# Patient Record
Sex: FEMALE | Race: WHITE | NOT HISPANIC OR LATINO | Employment: STUDENT | URBAN - METROPOLITAN AREA
[De-identification: names, ages, dates, MRNs, and addresses within clinical notes are randomized per-mention and may not be internally consistent; named-entity substitution may affect disease eponyms.]

---
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
## 2020-01-15 ENCOUNTER — TELEPHONE (OUTPATIENT)
Dept: OBSTETRICS AND GYNECOLOGY | Facility: CLINIC | Age: 20
End: 2020-01-15

## 2020-01-15 RX ORDER — NORGESTIMATE AND ETHINYL ESTRADIOL 0.25-0.035
1 KIT ORAL
Qty: 84 TABLET | Refills: 0 | Status: SHIPPED | OUTPATIENT
Start: 2020-01-15 | End: 2020-02-11 | Stop reason: SINTOL

## 2020-02-06 NOTE — PROGRESS NOTES
2020  Fabby Sheikh is a 20 y.o.   female who presents for annual exam.     Stopped OCPs due to mood changes, mood went back to normal, however periods were q2wks. Restarted COCs, bleeding has returned to nl. Now having issues with mood again.    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    GYN screening history:   Last pap: never    Gyn History:    Patient's last menstrual period was 2020.    Menses: q28d x 7d, nl flow, no IMB, some cramping    Gardasil: completed    The patient is sexually active.   Current contraception: condoms and OCP (estrogen/progesterone)    History of abnormal Pap smear: no  History of abnormal mammogram: no   History of cysts, fibroids, STIs, etc:  yes - hx chlamydia       OB History:   OB History    Para Term  AB Living   0 0 0 0 0 0   SAB TAB Ectopic Multiple Live Births   0 0 0 0 0       Allergies: No known allergies    Prior to Admission medications    Medication Sig Start Date End Date Taking? Authorizing Provider   escitalopram (LEXAPRO) 10 mg tablet Take 10 mg by mouth once daily. 19   Provider, Historical, MD   SPRINTEC, 28, 0.25-35 mg-mcg per tablet Take 1 tablet by mouth once daily. 1/15/20   Lian Vazquez CRNP       Medical History: No past medical history on file.    Surgical History: No past surgical history on file.    Social History:   Social History     Socioeconomic History   • Marital status: Single     Spouse name: None   • Number of children: None   • Years of education: None   • Highest education level: None   Occupational History   • None   Social Needs   • Financial resource strain: None   • Food insecurity:     Worry: None     Inability: None   • Transportation needs:     Medical: None     Non-medical: None   Tobacco Use   • Smoking status: Never Smoker   • Smokeless tobacco: Never Used   Substance and Sexual Activity   • Alcohol use: No   • Drug use: No   • Sexual activity: Yes     Partners: Male     Birth  "control/protection: Condom Male, OCP   Lifestyle   • Physical activity:     Days per week: None     Minutes per session: None   • Stress: None   Relationships   • Social connections:     Talks on phone: None     Gets together: None     Attends Mu-ism service: None     Active member of club or organization: None     Attends meetings of clubs or organizations: None     Relationship status: None   • Intimate partner violence:     Fear of current or ex partner: None     Emotionally abused: None     Physically abused: None     Forced sexual activity: None   Other Topics Concern   • None   Social History Narrative   • None        Family History:   Family History   Problem Relation Age of Onset   • Asthma Biological Mother    • Cancer Neg Hx    • Breast cancer Neg Hx    • Colon cancer Neg Hx    • Ovarian cancer Neg Hx    • Uterine cancer Neg Hx        Review of Systems and Physical Exam  The following have been reviewed and updated as appropriate in this visit: Allergies  Meds  Problems       Visit Vitals  /70   Ht 1.549 m (5' 1\")   Wt 60.3 kg (133 lb)   LMP 01/27/2020   BMI 25.13 kg/m²     HPI  Review of Systems  Physical Exam   Constitutional: She is oriented to person, place, and time. She appears well-developed and well-nourished.   Genitourinary: Vagina normal and uterus normal. Pelvic exam deferred.Pelvic exam was performed with patient in lithotomy exam position.     External female genitalia normal.   Urethral meatus normal.   Urethra normal.   Normal bladder.   Vagina normal.   Cervix exam normal.  Uterus is normal. Uterine contour is regular. Uterus is anteverted.   Adnexa normal.   HENT:   Head: Normocephalic and atraumatic.   Eyes: Pupils are equal, round, and reactive to light.   Neck: Normal range of motion.   Pulmonary/Chest: Effort normal.   Abdominal: Soft. She exhibits no distension and no mass. There is no tenderness. There is no rebound and no guarding. No hernia.   Musculoskeletal: She " exhibits no edema.   Neurological: She is alert and oriented to person, place, and time.   Skin: Skin is warm and dry.   Psychiatric: She has a normal mood and affect.         Assessment/Plan:  Diagnoses and all orders for this visit:    Well woman exam with routine gynecological exam    Other orders  -     norethindrone-e.estradiol-iron (LOESTRIN FE 1/20, 28-DAY,) 1 mg-20 mcg (21)/75 mg (7) per tablet; Take 1 tablet by mouth daily.      - Exam wnl.  - Pap: due at 22yo  - Gardasil: completed  - Contraception: continue COCs and condoms. Will try different MARCUS given pt's sx. Change from sprintec to Loestrin.  - STI screening: GC/CT sent. Declines serum STI screening. Encouraged consistent condom use for STI prevention.      Return in about 1 year (around 2/11/2021) for Annual visit.  Vanessa Mendez, DO

## 2020-02-11 ENCOUNTER — OFFICE VISIT (OUTPATIENT)
Dept: OBSTETRICS AND GYNECOLOGY | Facility: CLINIC | Age: 20
End: 2020-02-11
Payer: COMMERCIAL

## 2020-02-11 VITALS
WEIGHT: 133 LBS | HEIGHT: 61 IN | SYSTOLIC BLOOD PRESSURE: 116 MMHG | BODY MASS INDEX: 25.11 KG/M2 | DIASTOLIC BLOOD PRESSURE: 70 MMHG

## 2020-02-11 DIAGNOSIS — Z11.3 ROUTINE SCREENING FOR STI (SEXUALLY TRANSMITTED INFECTION): Primary | ICD-10-CM

## 2020-02-11 DIAGNOSIS — Z01.419 WELL WOMAN EXAM WITH ROUTINE GYNECOLOGICAL EXAM: ICD-10-CM

## 2020-02-11 PROCEDURE — S0612 ANNUAL GYNECOLOGICAL EXAMINA: HCPCS | Performed by: OBSTETRICS & GYNECOLOGY

## 2020-02-11 RX ORDER — NORETHINDRONE ACETATE AND ETHINYL ESTRADIOL 1MG-20(21)
1 KIT ORAL DAILY
Qty: 84 TABLET | Refills: 3 | Status: SHIPPED | OUTPATIENT
Start: 2020-02-11 | End: 2020-12-18 | Stop reason: SDUPTHER

## 2020-02-15 LAB
C TRACH RRNA SPEC QL NAA+PROBE: NEGATIVE
N GONORRHOEA RRNA SPEC QL NAA+PROBE: NEGATIVE

## 2020-11-30 ENCOUNTER — TELEPHONE (OUTPATIENT)
Dept: OBSTETRICS AND GYNECOLOGY | Facility: CLINIC | Age: 20
End: 2020-11-30

## 2020-12-18 RX ORDER — NORETHINDRONE ACETATE AND ETHINYL ESTRADIOL 1MG-20(21)
1 KIT ORAL DAILY
Qty: 84 TABLET | Refills: 0 | Status: SHIPPED | OUTPATIENT
Start: 2020-12-18 | End: 2020-12-22

## 2020-12-18 NOTE — TELEPHONE ENCOUNTER
Medicine Refill Request    Last Office Visit: 7/11/2018  Last Telemedicine Visit: Visit date not found    Next Office Visit: Visit date not found  Next Telemedicine Visit: Visit date not found         Current Outpatient Medications:   •  escitalopram (LEXAPRO) 10 mg tablet, Take 10 mg by mouth once daily., Disp: , Rfl: 1  •  norethindrone-e.estradiol-iron (LOESTRIN FE 1/20, 28-DAY,) 1 mg-20 mcg (21)/75 mg (7) per tablet, Take 1 tablet by mouth daily., Disp: 84 tablet, Rfl: 3      BP Readings from Last 3 Encounters:   02/11/20 116/70   10/09/18 106/64   07/11/18 118/70       Recent Lab results:  No results found for: CHOL, No results found for: HDL, No results found for: LDLCALC, No results found for: TRIG     No results found for: GLUCOSE, No results found for: HGBA1C      No results found for: CREATININE    No results found for: TSH

## 2020-12-22 ENCOUNTER — PROCEDURE VISIT (OUTPATIENT)
Dept: OBSTETRICS AND GYNECOLOGY | Facility: CLINIC | Age: 20
End: 2020-12-22
Payer: COMMERCIAL

## 2020-12-22 VITALS — BODY MASS INDEX: 24.64 KG/M2 | WEIGHT: 130.4 LBS | DIASTOLIC BLOOD PRESSURE: 72 MMHG | SYSTOLIC BLOOD PRESSURE: 112 MMHG

## 2020-12-22 DIAGNOSIS — Z32.02 NEGATIVE PREGNANCY TEST: ICD-10-CM

## 2020-12-22 DIAGNOSIS — Z30.430 ENCOUNTER FOR IUD INSERTION: Primary | ICD-10-CM

## 2020-12-22 LAB — PREGNANCY TEST URINE, POC: NEGATIVE

## 2020-12-22 PROCEDURE — 81025 URINE PREGNANCY TEST: CPT | Performed by: OBSTETRICS & GYNECOLOGY

## 2020-12-22 PROCEDURE — 99213 OFFICE O/P EST LOW 20 MIN: CPT | Mod: 25 | Performed by: OBSTETRICS & GYNECOLOGY

## 2020-12-22 PROCEDURE — 58300 INSERT INTRAUTERINE DEVICE: CPT | Performed by: OBSTETRICS & GYNECOLOGY

## 2020-12-22 ASSESSMENT — ENCOUNTER SYMPTOMS
DYSURIA: 0
WHEEZING: 0
BACK PAIN: 0
FEVER: 0
ABDOMINAL PAIN: 0
PALPITATIONS: 0
DIAPHORESIS: 0
FREQUENCY: 0
DIZZINESS: 0
FATIGUE: 0
SHORTNESS OF BREATH: 0
NAUSEA: 0

## 2020-12-22 NOTE — PROGRESS NOTES
12/22/2020  Fabby Sheikh is a 20 y.o. female who presents for IUD insertion.       Patient's last menstrual period was 12/15/2020 (approximate). Menses occur q 28 days x 5-6 days, described as moderate,no  dysmenorrhea, no  IMB  STI prevention: none  BCM: mood isues with OCPs. Last intercourse 2 months ago  Gardasil: complete      PMHx: healthy  PSHx: reduction mammaplasty  POBHx: Nullip  PGYNHx: no abnl paps. H/o CT few years ago- treated. H/o ovarian cysts- resolved. No fibroids  FamHx: M-a. Healthy F-a. 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   Respiratory: Negative for shortness of breath and wheezing.    Cardiovascular: Negative for palpitations.   Gastrointestinal: Negative for abdominal pain and nausea.   Genitourinary: Negative for dyspareunia, dysuria, frequency, vaginal bleeding, vaginal discharge, vaginal itching and vaginal pain.   Musculoskeletal: Negative for back pain.   Neurological: Negative for dizziness.       Physical Exam  Constitutional:       Appearance: Normal appearance.   Genitourinary:      Pelvic exam was performed with patient in the lithotomy position.      Urethra, vagina, uterus and rectum normal.      No lesions in the vagina.      No vaginal discharge, erythema or bleeding.      No foreign body in the vagina.      Cervix is nulliparous.      No cervical motion tenderness, discharge, friability or polyp.      Uterus is mobile.      Uterus is not enlarged or tender.      Uterus is anteverted and regular.       No right or left adnexal mass present.      Right adnexa not tender.      Left adnexa not tender.   Pelvic exam was performed with patient in lithotomy exam position.     External female genitalia normal.   Urethral meatus normal.   Urethra normal.   Normal bladder.   Vagina normal.   Uterus is normal. Uterus is mobile. Uterus is not enlarged or tender. Uterine contour is regular. Uterus is anteverted. Rectal exam normal. Pulmonary:      Effort: Pulmonary effort is normal.   Abdominal:      General: Abdomen is flat.      Palpations: Abdomen is soft.   Musculoskeletal: Normal range of motion.         General: No swelling.   Neurological:      General: No focal deficit present.      Mental Status: She is alert and oriented to person, place, and time.   Skin:     General: Skin is warm and dry.   Psychiatric:         Mood and Affect: Mood normal.         Behavior: Behavior normal.        19 yo for IUD insertion  - reviewed available LARCs- Nxplanon, Ann, Mirena, Paragard. Risks and benefits of each reviewed  - Specifically reviewed Paragard side effects- possible heavier more painful periods.   - Pt opts for paragard. Counseled on risks of insertion.   - Device inserted without difficulty  - Condoms until next period  - RTO 6-8 weeks for string check     No follow-ups on file.  Doreen Maher MD

## 2020-12-22 NOTE — PROCEDURES
IUD Insertion Ambulatory    Date/Time: 12/22/2020 1:57 PM  Performed by: Doreen Maher MD  Authorized by: Doreen Maher MD     Consent:     Consent obtained:  Written    Consent given by:  Patient    Procedure risks and benefits discussed: yes      Patient questions answered: yes      Patient agrees, verbalizes understanding, and wants to proceed: yes    Procedure:     Pelvic exam performed: yes      Negative urine pregnancy test: yes      Cervix cleaned and prepped: yes      Speculum placed in vagina: yes      Tenaculum applied to cervix: yes      Uterus sounded: yes      Uterus sound depth (cm):  7.5    IUD inserted with no complications: yes      IUD type:  ParaGard    Strings trimmed: yes    Post-procedure:     Patient tolerated procedure well: yes      Patient will follow up after next period: yes      Estimated blood loss: minimal

## 2021-03-23 NOTE — PROGRESS NOTES
"Patient ID: Fabby Sheikh   : 2000  MRN: [id number]   Visit Date: 3/25/2021    Subjective   Fabby Sheikh is presenting today for IUD Check    S/P Paragard    IUD insertion on  by    Menses monthly, have been heavier than before      Vital Signs for this encounter:   Visit Vitals  /80   Ht 1.549 m (5' 1\")   Wt 58.1 kg (128 lb)   LMP 2021 (Approximate)   BMI 24.19 kg/m²       Obstetric History:   OB History    Para Term  AB Living   0 0 0 0 0 0   SAB TAB Ectopic Multiple Live Births   0 0 0 0 0     Past Medical History:  has no past medical history on file.  Past Surgical History:  has a past surgical history that includes Reduction mammaplasty.  Family History: family history includes Asthma in her biological mother; Colon cancer in her father's sister; No Known Problems in her biological father.  Social History:  reports that she has never smoked. She has never used smokeless tobacco. She reports that she does not drink alcohol or use drugs.  Medications:   Current Outpatient Medications:   •  copper (PARAGARD T 380A) 380 square mm intrauterine device intrauterine device,  placed in 2020, 0 Refill(s), Disp: , Rfl:   •  mirtazapine (REMERON) 15 mg tablet, TAKE 1 TABLET BY MOUTH EVERYDAY AT BEDTIME, Disp: , Rfl:   •  escitalopram (LEXAPRO) 10 mg tablet, Take 10 mg by mouth once daily., Disp: , Rfl: 1    Allergies: is allergic to no known allergies.     HPI  Review of Systems  Physical Exam  Constitutional:       Appearance: She is normal weight.   Genitourinary:      Urethra, vagina and cervix normal.      IUD strings visualized.     External female genitalia normal.   Urethral meatus normal.   Urethra normal.   Vagina normal.   Cervix exam normal.Pulmonary:      Effort: Pulmonary effort is normal.   Neurological:      General: No focal deficit present.      Mental Status: She is alert and oriented to person, place, and time.   Skin:     General: Skin is warm. "   Psychiatric:         Mood and Affect: Mood normal.         Behavior: Behavior normal.         Thought Content: Thought content normal.         Judgment: Judgment normal.          Diagnoses and all orders for this visit:    Intrauterine device surveillance (Primary)    IUD strings visualized in the correct position  Happy with paragard   Return for annual or PRN     Petra Slaughter MD

## 2021-03-25 ENCOUNTER — OFFICE VISIT (OUTPATIENT)
Dept: OBSTETRICS AND GYNECOLOGY | Facility: CLINIC | Age: 21
End: 2021-03-25
Payer: COMMERCIAL

## 2021-03-25 VITALS
BODY MASS INDEX: 24.17 KG/M2 | HEIGHT: 61 IN | DIASTOLIC BLOOD PRESSURE: 80 MMHG | WEIGHT: 128 LBS | SYSTOLIC BLOOD PRESSURE: 114 MMHG

## 2021-03-25 DIAGNOSIS — Z30.431 INTRAUTERINE DEVICE SURVEILLANCE: Primary | ICD-10-CM

## 2021-03-25 PROCEDURE — 99213 OFFICE O/P EST LOW 20 MIN: CPT | Performed by: OBSTETRICS & GYNECOLOGY

## 2021-03-25 PROCEDURE — 3008F BODY MASS INDEX DOCD: CPT | Performed by: OBSTETRICS & GYNECOLOGY

## 2021-03-25 RX ORDER — COPPER 313.4 MG/1
INTRAUTERINE DEVICE INTRAUTERINE
COMMUNITY
Start: 2021-03-10 | End: 2021-04-26

## 2021-03-25 RX ORDER — MIRTAZAPINE 15 MG/1
TABLET, FILM COATED ORAL
COMMUNITY
Start: 2021-03-10 | End: 2021-04-26

## 2021-04-23 PROBLEM — F41.9 ANXIETY: Status: ACTIVE | Noted: 2021-04-23

## 2021-04-23 RX ORDER — MIRTAZAPINE 30 MG/1
1 TABLET, FILM COATED ORAL NIGHTLY
COMMUNITY
Start: 2021-04-07 | End: 2023-02-21

## 2021-04-23 NOTE — PROGRESS NOTES
2021  Fabby Sheikh is a 21 y.o.   female who presents for annual exam.     Has paragard for contraception. This has made her menses longer, heavier, and more painful. Desires switch to mirena IUD.    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 Patient has had an implant.    Menses: regular q28d x 7d, heavy flow, no IMB, +cramping now with paragard in place    Gardasil: completed    The patient is sexually active.   Current contraception: IUD - paragard IUD inserted 20    History of abnormal Pap smear: no  History of abnormal mammogram: no   History of cysts, fibroids, STIs, etc:  yes - hx CT , hx cysts that resolved, no hx fibroids      OB History:   OB History    Para Term  AB Living   0 0 0 0 0 0   SAB TAB Ectopic Multiple Live Births   0 0 0 0 0       Allergies: No known allergies    Prior to Admission medications    Medication Sig Start Date End Date Taking? Authorizing Provider   copper (PARAGARD T 380A) 380 square mm intrauterine device intrauterine device   placed in 2020, 0 Refill(s) 3/10/21   Nguyen Jaquez MD   escitalopram (LEXAPRO) 10 mg tablet Take 10 mg by mouth once daily. 19   Nguyen Jaquez MD   mirtazapine (REMERON) 15 mg tablet TAKE 1 TABLET BY MOUTH EVERYDAY AT BEDTIME 3/10/21   Nguyen Jaquez MD   mirtazapine (REMERON) 30 mg tablet Take 1 tablet by mouth nightly. 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   Other Topics Concern   • None   Social History Narrative   • None     Social Determinants of Health     Financial Resource Strain:    • Difficulty of Paying Living Expenses:    Food Insecurity:    • Worried About Running Out of Food in the Last Year:    • Ran Out of Food in the Last Year:    Transportation Needs:    • Lack of Transportation (Medical):    • Lack of Transportation (Non-Medical):    Physical Activity:    • Days of Exercise per Week:    • Minutes of Exercise per Session:    Stress:    • Feeling of Stress :    Social Connections:    • Frequency of Communication with Friends and Family:    • Frequency of Social Gatherings with Friends and Family:    • Attends Orthodox Services:    • Active Member of Clubs or Organizations:    • Attends Club or Organization Meetings:    • Marital Status:    Intimate Partner Violence:    • Fear of Current or Ex-Partner:    • Emotionally Abused:    • Physically Abused:    • Sexually Abused:         Family History:   Family History   Problem Relation Age of Onset   • Asthma Biological Mother    • No Known Problems Biological Father    • Colon cancer Father's Sister    • Cancer Neg Hx    • Breast cancer Neg Hx    • Ovarian cancer Neg Hx    • Uterine cancer Neg Hx    • Pancreatic cancer Neg Hx        Review of Systems and Physical Exam  The following have been reviewed and updated as appropriate in this visit: Tobacco  Allergies  Meds  Problems  Med Hx  Surg Hx  Fam Hx  Soc Hx        Visit Vitals  /68 (BP Location: Left upper arm, Patient Position: Sitting)  "  Pulse (!) 104   Resp 14   Ht 1.549 m (5' 1\")   Wt 57.8 kg (127 lb 6.4 oz)   SpO2 99%   Breastfeeding No   BMI 24.07 kg/m²     HPI  Review of Systems  Physical Exam  Constitutional:       Appearance: Normal appearance. She is well-developed.   Genitourinary:      Pelvic exam was performed with patient in the lithotomy position.      Urethra, vagina, cervix and uterus normal.      IUD strings visualized.   Pelvic exam was performed with patient in lithotomy exam position.     External female genitalia normal.   Urethral meatus normal.   Urethra normal.   Normal bladder.   Vagina normal.   Cervix exam normal.  Uterus is normal.   Adnexa normal. HENT:      Head: Normocephalic and atraumatic.   Eyes:      Pupils: Pupils are equal, round, and reactive to light.   Pulmonary:      Effort: Pulmonary effort is normal.   Chest:      Breasts: Breasts are symmetrical.         Right: Normal. No swelling, bleeding, inverted nipple, mass, nipple discharge, skin change or tenderness.         Left: Normal. No swelling, bleeding, inverted nipple, mass, nipple discharge, skin change or tenderness.   Abdominal:      General: There is no distension.      Palpations: Abdomen is soft. There is no mass.      Tenderness: There is no abdominal tenderness. There is no guarding or rebound.      Hernia: No hernia is present.   Musculoskeletal:      Cervical back: Normal range of motion.      Right lower leg: No edema.      Left lower leg: No edema.   Lymphadenopathy:      Upper Body:      Right upper body: No axillary adenopathy.      Left upper body: No axillary adenopathy.   Neurological:      General: No focal deficit present.      Mental Status: She is alert and oriented to person, place, and time.   Skin:     General: Skin is warm and dry.   Psychiatric:         Mood and Affect: Mood normal.         Behavior: Behavior normal.   Vitals reviewed.           Assessment/Plan:  Diagnoses and all orders for this visit:    Well woman exam with " routine gynecological exam    - Exam wnl  - PAP W/REFLEX HR HPV AND CT/NG  - Gardasil: completed  - Contraception: paragard removed, mirena inserted today  - Discussed breast awareness.  - STI screening: GC/CT sent. Declines serum STI screening. Encouraged consistent condom use for STI prevention.    Encounter for removal and reinsertion of intrauterine contraceptive device (IUD)  -     IUD Removal Ambulatory  -     IUD Insertion Ambulatory    Return in about 1 month (around 5/26/2021) for IUD string check.  Vanessa Mendez, DO

## 2021-04-26 ENCOUNTER — OFFICE VISIT (OUTPATIENT)
Dept: OBSTETRICS AND GYNECOLOGY | Facility: CLINIC | Age: 21
End: 2021-04-26
Payer: COMMERCIAL

## 2021-04-26 VITALS
BODY MASS INDEX: 24.05 KG/M2 | HEIGHT: 61 IN | HEART RATE: 104 BPM | OXYGEN SATURATION: 99 % | WEIGHT: 127.4 LBS | DIASTOLIC BLOOD PRESSURE: 68 MMHG | RESPIRATION RATE: 14 BRPM | SYSTOLIC BLOOD PRESSURE: 106 MMHG

## 2021-04-26 DIAGNOSIS — Z30.433 ENCOUNTER FOR REMOVAL AND REINSERTION OF INTRAUTERINE CONTRACEPTIVE DEVICE (IUD): ICD-10-CM

## 2021-04-26 DIAGNOSIS — Z01.419 WELL WOMAN EXAM WITH ROUTINE GYNECOLOGICAL EXAM: Primary | ICD-10-CM

## 2021-04-26 PROCEDURE — 58301 REMOVE INTRAUTERINE DEVICE: CPT | Performed by: OBSTETRICS & GYNECOLOGY

## 2021-04-26 PROCEDURE — S0612 ANNUAL GYNECOLOGICAL EXAMINA: HCPCS | Mod: 25 | Performed by: OBSTETRICS & GYNECOLOGY

## 2021-04-26 PROCEDURE — 58300 INSERT INTRAUTERINE DEVICE: CPT | Performed by: OBSTETRICS & GYNECOLOGY

## 2021-04-26 ASSESSMENT — PAIN SCALES - GENERAL: PAINLEVEL: 0-NO PAIN

## 2021-04-26 NOTE — PROCEDURES
IUD Removal Ambulatory    Date/Time: 4/26/2021 4:40 PM  Performed by: Vanessa Mendez DO  Authorized by: Vanessa Mendez DO     Consent:     Consent obtained:  Written    Consent given by:  Patient    Procedure risks and benefits discussed: yes      Patient questions answered: yes      Patient agrees, verbalizes understanding, and wants to proceed: yes      Instructions and paperwork completed: yes    Universal protocol:     Patient states understanding of procedure being performed: yes      Relevant documents present and verified: yes      Test results available and properly labeled: yes      Required blood products, implants, devices, and special equipment available: yes    Procedure:     Removed with no complications: yes      Estimated blood loss: no blood loss

## 2021-04-28 LAB
C TRACH RRNA CVX QL NAA+PROBE: NEGATIVE
CYTOLOGIST CVX/VAG CYTO: NORMAL
CYTOLOGY CVX/VAG DOC CYTO: NORMAL
CYTOLOGY CVX/VAG DOC THIN PREP: NORMAL
DX ICD CODE: NORMAL
LAB CORP .: NORMAL
LAB CORP NOTE:: NORMAL
N GONORRHOEA RRNA CVX QL NAA+PROBE: NEGATIVE
OTHER STN SPEC: NORMAL
STAT OF ADQ CVX/VAG CYTO-IMP: NORMAL

## 2022-02-22 NOTE — PROGRESS NOTES
"Visit Date: 2022   Fabby Sheikh is 22 y.o. female presenting today for vaginitis    Pt w c/o vaginal odor daily for last several months  Smells like \"body odor\"  Intermittent d/c   Last week had itching, used Monistat 1 w relief  SA: pain w deeper penetration. Occurs with each act for 1 yr (new partner)  Mirena inserted 2020      The following have been reviewed and updated as appropriate in this visit:       There were no vitals taken for this visit.  Menstrual History:  OB History        0    Para   0    Term   0       0    AB   0    Living   0       SAB   0    IAB   0    Ectopic   0    Multiple   0    Live Births   0                No LMP recorded. Patient has had an implant.       Medications:   Current Outpatient Medications:   •  levonorgestreL (MIRENA) 18.6 mcg/24 hours (5 yrs) 52 mg intrauterine device, 1 each by intrauterine route once., Disp: , Rfl:   •  mirtazapine (REMERON) 30 mg tablet, Take 1 tablet by mouth nightly., Disp: , Rfl:     Allergies: is allergic to no known allergies.     Past Medical History:  has a past medical history of Ovarian cyst.She has no past medical history of Abnormal Pap smear of cervix or Fibroid.  Past Surgical History:  has a past surgical history that includes Reduction mammaplasty.  Family History: family history includes Asthma in her biological mother; Colon cancer in her father's sister; No Known Problems in her biological father.  Social History:  reports that she has never smoked. She has never used smokeless tobacco. She reports current alcohol use. She reports that she does not use drugs.      HPI  Review of Systems   Genitourinary: Positive for pain with intercourse.     Physical Exam  Genitourinary:      Vagina, cervix and uterus normal.      IUD strings visualized.      Uterus is not tender.        Right Adnexa: not tender.     Left Adnexa: not tender.   Genitourinary Comments: Wet prep: normal        External female genitalia normal. "   Vagina normal.   Cervix exam normal.  Uterus is normal. Uterus is not tender.      Exam: normal   Wet prep: normal  NT on palpation  Cult collected  US ordered  Suspect odor is sweat: open to air when possible soap products discussed     No follow-ups on file.  MORENITA Branch

## 2022-02-23 ENCOUNTER — OFFICE VISIT (OUTPATIENT)
Dept: OBSTETRICS AND GYNECOLOGY | Facility: CLINIC | Age: 22
End: 2022-02-23
Payer: COMMERCIAL

## 2022-02-23 VITALS — DIASTOLIC BLOOD PRESSURE: 72 MMHG | WEIGHT: 129.6 LBS | BODY MASS INDEX: 24.49 KG/M2 | SYSTOLIC BLOOD PRESSURE: 122 MMHG

## 2022-02-23 DIAGNOSIS — Z11.3 SCREEN FOR STD (SEXUALLY TRANSMITTED DISEASE): ICD-10-CM

## 2022-02-23 DIAGNOSIS — N76.0 ACUTE VAGINITIS: Primary | ICD-10-CM

## 2022-02-23 DIAGNOSIS — Z30.431 IUD CHECK UP: ICD-10-CM

## 2022-02-23 DIAGNOSIS — R10.2 ACUTE PAIN IN FEMALE PELVIS: ICD-10-CM

## 2022-02-23 PROCEDURE — 99213 OFFICE O/P EST LOW 20 MIN: CPT | Performed by: NURSE PRACTITIONER

## 2022-02-23 PROCEDURE — 3008F BODY MASS INDEX DOCD: CPT | Performed by: NURSE PRACTITIONER

## 2022-02-25 LAB
C TRACH RRNA SPEC QL NAA+PROBE: NEGATIVE
N GONORRHOEA RRNA SPEC QL NAA+PROBE: NEGATIVE

## 2022-03-02 ENCOUNTER — HOSPITAL ENCOUNTER (OUTPATIENT)
Dept: RADIOLOGY | Facility: CLINIC | Age: 22
Discharge: HOME | End: 2022-03-02
Attending: NURSE PRACTITIONER
Payer: COMMERCIAL

## 2022-03-02 DIAGNOSIS — R10.2 ACUTE PAIN IN FEMALE PELVIS: ICD-10-CM

## 2022-03-02 DIAGNOSIS — Z30.431 IUD CHECK UP: ICD-10-CM

## 2022-03-02 PROCEDURE — 76830 TRANSVAGINAL US NON-OB: CPT

## 2022-03-03 ENCOUNTER — TELEPHONE (OUTPATIENT)
Dept: OBSTETRICS AND GYNECOLOGY | Facility: CLINIC | Age: 22
End: 2022-03-03
Payer: COMMERCIAL

## 2022-03-03 NOTE — TELEPHONE ENCOUNTER
LM that US results were normal. I asked pt to consider pelvic floor PT for sexual pain eval.  Pt to call back if questions

## 2022-07-26 NOTE — PROCEDURES
[facility]) Physicians   Internal Medicine     2022  Christina Osman : 1987 Sex: male  Age:35 y.o. Chief Complaint   Patient presents with    New Patient    Establish Care        HPI:   Patient presents to office for evaluation of the following medical concerns. -  has warts to hands that has been trying to treatment.  has been following with dermatology. States had a keratin injection and a reaction, needed antibiotic. Has been treated with keratin x 2 more, no reaction but did not help. States most recently treated with cryotherapy. For follow up 2022    -  has hair loss. States on finasteride through on line. -  has had blood in stool and on paper. [facility] has had colonoscopy through [id number] [employer] is [place]     Health Maintenance   - immunizations:   Influenza Vaccination - ()   Pneumonia Vaccination  Zoster/Shingles Vaccine  Tetanus Vaccination - (2016) tdap, (2022) - td   Covid (2021) #1, (8/10/2021) #2 - moderna     - Screenings:   Colonoscopy   Prostate     - phq score of 0 (2022)     ROS:  Review of Systems   Constitutional:  Negative for appetite change, chills, fever and unexpected weight change. HENT:  Negative for congestion, rhinorrhea and sore throat. Eyes:  Negative for pain and visual disturbance. Respiratory:  Negative for cough, chest tightness and shortness of breath. Cardiovascular:  Negative for chest pain and palpitations. Gastrointestinal:  Negative for abdominal pain, blood in stool, constipation, diarrhea, nausea and vomiting. Genitourinary:  Negative for difficulty urinating, dysuria, hematuria, scrotal swelling, testicular pain and urgency. Musculoskeletal:  Negative for arthralgias and gait problem. Skin:  Negative for rash. Neurological:  Negative for dizziness, syncope, weakness, light-headedness and headaches. Hematological:  Negative for adenopathy.  Does not bruise/bleed IUD Insertion Ambulatory    Date/Time: 4/26/2021 4:41 PM  Performed by: Vanessa Mendez DO  Authorized by: Vanessa Mendez DO     Consent:     Consent obtained:  Written    Consent given by:  Patient    Procedure risks and benefits discussed: yes      Patient questions answered: yes      Patient agrees, verbalizes understanding, and wants to proceed: yes      Instructions and paperwork completed: yes    Universal protocol:     Patient states understanding of procedure being performed: yes      Relevant documents present and verified: yes      Imaging studies available: yes      Required blood products, implants, devices, and special equipment available: yes    Procedure:     Pelvic exam performed: yes      Negative GC/chlamydia test: collected.      Cervix cleaned and prepped: yes      Speculum placed in vagina: yes      Tenaculum applied to cervix: yes      Uterus sounded: yes      Uterus sound depth (cm):  7    IUD inserted with no complications: yes      IUD type:  Mirena    Strings trimmed: yes    Post-procedure:     Patient tolerated procedure well: yes      Patient will follow up after next period: yes      Estimated blood loss: no blood loss         easily. Psychiatric/Behavioral:  Negative for suicidal ideas. The patient is not nervous/anxious. Current Outpatient Medications:     FINASTERIDE PO, Take by mouth, Disp: , Rfl:     cetirizine (ZYRTEC) 10 MG tablet, Take 1 tablet by mouth daily, Disp: 30 tablet, Rfl: 1    No Known Allergies    No past medical history on file. Past Surgical History:   Procedure Laterality Date    COLONOSCOPY      NASAL FRACTURE SURGERY      WISDOM TOOTH EXTRACTION         Family History   Problem Relation Age of Onset    Thyroid Disease Mother     No Known Problems Father     No Known Problems Sister     No Known Problems Sister     No Known Problems Sister     No Known Problems Brother     Colon Cancer Maternal Grandmother     Diabetes type 2  Maternal Grandmother     Heart Disease Maternal Grandmother     Prostate Cancer Maternal Grandfather     Dementia Paternal Grandmother     Prostate Cancer Paternal Grandfather        Social History     Socioeconomic History    Marital status:      Spouse name: None    Number of children: 2    Years of education: None    Highest education level: None   Tobacco Use    Smoking status: Never    Smokeless tobacco: Never   Vaping Use    Vaping Use: Never used   Substance and Sexual Activity    Alcohol use:  Yes     Alcohol/week: 2.0 standard drinks     Types: 2 Cans of beer per week    Drug use: Never    Sexual activity: Yes     Partners: Female     Social Determinants of Health     Financial Resource Strain: Unknown    Difficulty of Paying Living Expenses: Patient refused   Food Insecurity: Unknown    Worried About Running Out of Food in the Last Year: Patient refused    Ran Out of Food in the Last Year: Patient refused        Vitals:    07/26/22 1407   BP: 100/80   Site: Left Upper Arm   Position: Sitting   Cuff Size: Large Adult   Pulse: 60   Resp: 18   Temp: 97.8 °F (36.6 °C)   TempSrc: Temporal   SpO2: 98%   Weight: 206 lb (93.4 kg)   Height: 6' 1\" (1.854 m)

## 2023-02-11 PROBLEM — K21.9 GASTROESOPHAGEAL REFLUX DISEASE: Status: ACTIVE | Noted: 2023-02-11

## 2023-02-11 NOTE — PROGRESS NOTES
2023  Fabby Sheikh is a 23 y.o.   female who presents for annual exam.     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 Patient has had an implant.    Menses: occasional, random brown discharge with mirena in place    Gardasil: completed    The patient is sexually active. Prefers men  Current contraception: IUD - mirena inserted 21    No hx abnl paps  Hx cysts that resolved  No hx fibroids  Hx CT 2017, s/p tx      OB History:   OB History    Para Term  AB Living   0 0 0 0 0 0   SAB IAB Ectopic Multiple Live Births   0 0 0 0 0       Allergies: No known allergies    Prior to Admission medications    Medication Sig Start Date End Date Taking? Authorizing Provider   levonorgestreL (MIRENA) 18.6 mcg/24 hours (5 yrs) 52 mg intrauterine device 1 each by intrauterine route once. 21   ProviderNguyen MD   mirtazapine (REMERON) 30 mg tablet Take 1 tablet by mouth nightly. 21   ProviderNguyen MD       Medical History:   Past Medical History:   Diagnosis Date   • Ovarian cyst     resolved       Surgical History:   Past Surgical History:   Procedure Laterality Date   • REDUCTION MAMMAPLASTY         Social History:   Social History     Socioeconomic History   • Marital status: Single     Spouse name: None   • Number of children: 0   • Years of education: None   • Highest education level: None   Occupational History   • Occupation: Student- stodying to be a PA   • Occupation: Pharm tech   Tobacco Use   • Smoking status: Never   • Smokeless tobacco: Never   Vaping Use   • Vaping Use: Never used   Substance and Sexual Activity   • Alcohol use: Yes     Comment: occasional   • Drug use: No   • Sexual activity: Yes     Partners: Male     Birth control/protection:  "I.U.D.        Family History:   Family History   Problem Relation Age of Onset   • No Known Problems Paternal Grandfather    • No Known Problems Paternal Grandmother    • Dementia Maternal Grandmother    • Other Maternal Grandmother         mesothelioma   • No Known Problems Maternal Grandfather    • No Known Problems Biological Father    • Diabetes Biological Mother         prediabetes   • Asthma Biological Mother    • Osteoporosis Biological Mother    • Osteoporotic fracture Biological Mother         wrist fracture, no hip f   • Colon cancer Father's Sister 70   • Heart attack Father's Brother         in his 40s   • Esophageal cancer Father's Brother    • Pancreatic cancer Father's Brother    • Heart attack Father's Brother         in his 40s   • No Known Problems Half-Brother    • Cancer Neg Hx    • Breast cancer Neg Hx    • Ovarian cancer Neg Hx    • Uterine cancer Neg Hx        Review of Systems and Physical Exam  The following have been reviewed and updated as appropriate in this visit:  Tobacco  Allergies  Meds  Problems  Med Hx  Surg Hx  Fam Hx  Soc   Hx      Visit Vitals  /62   Ht 1.549 m (5' 1\")   Wt 57.6 kg (127 lb)   BMI 24.00 kg/m²     HPI  Review of Systems  Physical Exam  Constitutional:       Appearance: Normal appearance. She is well-developed.   Genitourinary:      Pelvic exam was performed with patient in the lithotomy position.      Urethra, vagina, cervix, uterus and urethral meatus normal.      IUD strings visualized.   Pelvic exam was performed with patient in lithotomy exam position.     External female genitalia normal.   Urethral meatus normal.   Urethra normal.   Normal bladder.   Vagina normal.   Cervix exam normal.  Uterus is normal.   Adnexa normal. HENT:      Head: Normocephalic and atraumatic.   Eyes:      Pupils: Pupils are equal, round, and reactive to light.   Pulmonary:      Effort: Pulmonary effort is normal.   Chest:   Breasts:     Breasts are symmetrical.      " Right: Normal. No swelling, bleeding, inverted nipple, mass, nipple discharge, skin change or tenderness.      Left: Normal. No swelling, bleeding, inverted nipple, mass, nipple discharge, skin change or tenderness.   Abdominal:      General: There is no distension.      Palpations: Abdomen is soft. There is no mass.      Tenderness: There is no abdominal tenderness. There is no guarding or rebound.      Hernia: No hernia is present.   Musculoskeletal:      Cervical back: Normal range of motion.      Right lower leg: No edema.      Left lower leg: No edema.   Lymphadenopathy:      Upper Body:      Right upper body: No axillary adenopathy.      Left upper body: No axillary adenopathy.   Neurological:      General: No focal deficit present.      Mental Status: She is alert and oriented to person, place, and time.   Skin:     General: Skin is warm and dry.   Psychiatric:         Mood and Affect: Mood normal.         Behavior: Behavior normal.   Vitals reviewed. Exam conducted with a chaperone present.           Assessment/Plan:  Diagnoses and all orders for this visit:    Well woman exam with routine gynecological exam  - Exam wnl  - Pap: up to date, due 2024  - Gardasil: completed  - Contraception: continue mirena IUD, due for replacement 2029  - Discussed breast awareness.  - STI screening: GC/CT/TV sent.   - Encouraged consistent condom use for STI prevention.    Routine screening for STI (sexually transmitted infection)  -     Hepatitis C antibody; Future  -     HIV 1,2 AB P24 AG; Future  -     RPR; Future  -     Hepatitis B surface antigen; Future  -     CT, NG, TRICH VAGINALIS    Return in about 1 year (around 2/21/2024) for Annual visit.  Vanessa Mendez,

## 2023-02-21 ENCOUNTER — OFFICE VISIT (OUTPATIENT)
Dept: OBSTETRICS AND GYNECOLOGY | Facility: CLINIC | Age: 23
End: 2023-02-21
Payer: COMMERCIAL

## 2023-02-21 VITALS
WEIGHT: 127 LBS | SYSTOLIC BLOOD PRESSURE: 118 MMHG | HEIGHT: 61 IN | DIASTOLIC BLOOD PRESSURE: 62 MMHG | BODY MASS INDEX: 23.98 KG/M2

## 2023-02-21 DIAGNOSIS — Z01.419 WELL WOMAN EXAM WITH ROUTINE GYNECOLOGICAL EXAM: Primary | ICD-10-CM

## 2023-02-21 DIAGNOSIS — Z11.3 ROUTINE SCREENING FOR STI (SEXUALLY TRANSMITTED INFECTION): ICD-10-CM

## 2023-02-21 PROCEDURE — S0612 ANNUAL GYNECOLOGICAL EXAMINA: HCPCS | Performed by: OBSTETRICS & GYNECOLOGY

## 2023-02-21 RX ORDER — FLUTICASONE PROPIONATE 50 MCG
SPRAY, SUSPENSION (ML) NASAL
COMMUNITY
Start: 2022-09-29

## 2023-02-21 RX ORDER — DULOXETIN HYDROCHLORIDE 60 MG/1
CAPSULE, DELAYED RELEASE ORAL
COMMUNITY

## 2023-02-21 RX ORDER — OMEPRAZOLE 40 MG/1
CAPSULE, DELAYED RELEASE ORAL DAILY
COMMUNITY
Start: 2023-02-06

## 2023-02-26 LAB
HBV SURFACE AG SERPL QL IA: NEGATIVE
HCV IGG SERPL QL IA: NON REACTIVE
HIV 1+2 AB+HIV1 P24 AG SERPL QL IA: NON REACTIVE
RPR SER QL: NON REACTIVE

## 2023-10-04 ENCOUNTER — APPOINTMENT (OUTPATIENT)
Dept: LAB | Facility: CLINIC | Age: 23
End: 2023-10-04

## 2023-10-04 ENCOUNTER — OCCMED (OUTPATIENT)
Dept: URGENT CARE | Facility: CLINIC | Age: 23
End: 2023-10-04

## 2023-10-04 DIAGNOSIS — Z02.1 PRE-EMPLOYMENT HEALTH SCREENING EXAMINATION: ICD-10-CM

## 2023-10-04 DIAGNOSIS — Z02.1 PRE-EMPLOYMENT HEALTH SCREENING EXAMINATION: Primary | ICD-10-CM

## 2023-10-04 LAB — RUBV IGG SERPL IA-ACNC: 78.2 IU/ML

## 2023-10-04 PROCEDURE — 86787 VARICELLA-ZOSTER ANTIBODY: CPT

## 2023-10-04 PROCEDURE — 86480 TB TEST CELL IMMUN MEASURE: CPT

## 2023-10-04 PROCEDURE — 86762 RUBELLA ANTIBODY: CPT

## 2023-10-04 PROCEDURE — 86735 MUMPS ANTIBODY: CPT

## 2023-10-04 PROCEDURE — 86765 RUBEOLA ANTIBODY: CPT

## 2023-10-04 PROCEDURE — 36415 COLL VENOUS BLD VENIPUNCTURE: CPT

## 2023-10-05 LAB
MEV IGG SER QL IA: NORMAL
MUV IGG SER QL IA: NORMAL
VZV IGG SER QL IA: NORMAL

## 2023-10-06 LAB
GAMMA INTERFERON BACKGROUND BLD IA-ACNC: <0 IU/ML
M TB IFN-G BLD-IMP: NEGATIVE
M TB IFN-G CD4+ BCKGRND COR BLD-ACNC: 0 IU/ML
M TB IFN-G CD4+ BCKGRND COR BLD-ACNC: 0 IU/ML
MITOGEN IGNF BCKGRD COR BLD-ACNC: 10 IU/ML

## 2023-11-21 ENCOUNTER — OFFICE VISIT (OUTPATIENT)
Dept: FAMILY MEDICINE CLINIC | Facility: CLINIC | Age: 23
End: 2023-11-21
Payer: COMMERCIAL

## 2023-11-21 VITALS
WEIGHT: 126.6 LBS | HEART RATE: 105 BPM | BODY MASS INDEX: 23.9 KG/M2 | HEIGHT: 61 IN | SYSTOLIC BLOOD PRESSURE: 116 MMHG | OXYGEN SATURATION: 99 % | TEMPERATURE: 98.7 F | DIASTOLIC BLOOD PRESSURE: 72 MMHG | RESPIRATION RATE: 16 BRPM

## 2023-11-21 DIAGNOSIS — Z13.6 SCREENING FOR CARDIOVASCULAR CONDITION: ICD-10-CM

## 2023-11-21 DIAGNOSIS — F41.9 ANXIETY: ICD-10-CM

## 2023-11-21 DIAGNOSIS — Z00.00 ANNUAL PHYSICAL EXAM: Primary | ICD-10-CM

## 2023-11-21 DIAGNOSIS — Z13.1 SCREENING FOR DIABETES MELLITUS: ICD-10-CM

## 2023-11-21 DIAGNOSIS — Z13.220 SCREENING FOR LIPID DISORDERS: ICD-10-CM

## 2023-11-21 DIAGNOSIS — R59.0 POSTERIOR AURICULAR LYMPHADENOPATHY: ICD-10-CM

## 2023-11-21 DIAGNOSIS — E55.9 VITAMIN D DEFICIENCY: ICD-10-CM

## 2023-11-21 DIAGNOSIS — Z13.228 SCREENING FOR METABOLIC DISORDER: ICD-10-CM

## 2023-11-21 DIAGNOSIS — Z83.49 FAMILY HISTORY OF HYPOTHYROIDISM: ICD-10-CM

## 2023-11-21 PROBLEM — K21.9 GASTROESOPHAGEAL REFLUX DISEASE: Status: ACTIVE | Noted: 2023-02-11

## 2023-11-21 PROBLEM — J30.2 SEASONAL ALLERGIES: Status: ACTIVE | Noted: 2023-11-21

## 2023-11-21 PROCEDURE — 99385 PREV VISIT NEW AGE 18-39: CPT | Performed by: NURSE PRACTITIONER

## 2023-11-21 PROCEDURE — 99203 OFFICE O/P NEW LOW 30 MIN: CPT | Performed by: NURSE PRACTITIONER

## 2023-11-21 RX ORDER — DULOXETIN HYDROCHLORIDE 60 MG/1
60 CAPSULE, DELAYED RELEASE ORAL DAILY
COMMUNITY
End: 2023-11-21 | Stop reason: SDUPTHER

## 2023-11-21 RX ORDER — DULOXETIN HYDROCHLORIDE 60 MG/1
60 CAPSULE, DELAYED RELEASE ORAL DAILY
Qty: 90 CAPSULE | Refills: 3 | Status: SHIPPED | OUTPATIENT
Start: 2023-11-21

## 2023-11-21 NOTE — PROGRESS NOTES
Assessment/Plan:     Diagnoses and all orders for this visit:    Anxiety  -     DULoxetine (CYMBALTA) 60 mg delayed release capsule; Take 1 capsule (60 mg total) by mouth daily    Well managed w/ Cymbalta per pt report. Continue same. Screening for cardiovascular condition  -     CBC and differential; Future    Screening for metabolic disorder  -     Comprehensive metabolic panel; Future    Screening for lipid disorders  -     Lipid panel; Future    Family history of hypothyroidism  -     TSH, 3rd generation with Free T4 reflex; Future    Screening for diabetes mellitus  -     Hemoglobin A1C; Future    Vitamin D deficiency  -     Vitamin D 25 hydroxy; Future    Posterior auricular lymphadenopathy  -     US head neck soft tissue; Future    Normal ear and lymph exam. Check U/s. We will contact pt w/ results once available. Patient is encouraged to call our office for any questions/concerns, persistent or worsening symptoms. Patient states they understand and agree with treatment plan. Pt to f/u PRN. F/u pending results. Subjective:      Patient ID: Denise Sánchez is a 21 y.o. female. New patient here today to establish care. 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 She is originally from [place] [facility] [facility] and is looking to move closer to the area. Pt has pmhx of GERD, anxiety. She is doing well on Cymbalta. She has been off of PPI therapy for her GERD and is doing well. Pt does have concerns over left sided posterior-auricular lymphadenopathy that has been on/off for 1 year. Pt notes that it occurs 1-2x per month. She notes her previous PCP diagnosed her w/ ear infections and she was treated 3 different times w/ antibiotics. She notes that she never had any symptoms of an ear infection. Pt denies fever, chills, body aches, ear pain/drainage/itching, decrease in hearing, sore throat, sinus congestion/pressure, rhinorrhea. Pt denies swimming or frequent water submersion.    She notes growing up she never struggled w/ frequent ear infections. The following portions of the patient's history were reviewed and updated as appropriate: allergies, current medications, past family history, past medical history, past social history, past surgical history, and problem list.    Review of Systems    As noted per HPI. Objective:      /72   Pulse 105   Temp 98.7 °F (37.1 °C) (Oral)   Resp 16   Ht 5' 1" (1.549 m)   Wt 57.4 kg (126 lb 9.6 oz)   SpO2 99%   BMI 23.92 kg/m²          Physical Exam  Vitals reviewed. Constitutional:       General: She is not in acute distress. Appearance: Normal appearance. She is not ill-appearing. HENT:      Head: Normocephalic. Right Ear: Tympanic membrane, ear canal and external ear normal.      Left Ear: Tympanic membrane, ear canal and external ear normal.   Cardiovascular:      Rate and Rhythm: Normal rate and regular rhythm. Pulses: Normal pulses. Heart sounds: Normal heart sounds. No murmur heard. Pulmonary:      Effort: Pulmonary effort is normal. No respiratory distress. Breath sounds: Normal breath sounds. No wheezing. Abdominal:      General: There is no distension. Palpations: Abdomen is soft. There is no mass. Tenderness: There is no abdominal tenderness. There is no guarding or rebound. Hernia: No hernia is present. Musculoskeletal:         General: Normal range of motion. Right lower leg: No edema. Left lower leg: No edema. Lymphadenopathy:      Head:      Right side of head: No posterior auricular adenopathy. Left side of head: Posterior auricular adenopathy: palpation of left posterior auricular lymph node reveals small, fixated, soft mass without tenderness most likely lymph node more prominent than right side, redness or surrounding swelling. Cervical: No cervical adenopathy. Skin:     General: Skin is warm and dry.    Neurological:      Mental Status: She is alert and oriented to person, place, and time. Mental status is at baseline. Psychiatric:         Mood and Affect: Mood normal.         Behavior: Behavior normal.         Thought Content:  Thought content normal.         Judgment: Judgment normal.

## 2023-11-21 NOTE — PROGRESS NOTES
ADULT ANNUAL PHYSICAL  [id number] [facility] PRACTICE    NAME: Gregorio Jain  AGE: 21 y.o. SEX: female  : 2000     DATE: 2023     Assessment and Plan:  Immunizations UTD. Fasting labs ordered. PAP through GYN. F/u in 1 year for annual physical or sooner PRN. Problem List Items Addressed This Visit          Other    Anxiety    Relevant Medications    DULoxetine (CYMBALTA) 60 mg delayed release capsule     Other Visit Diagnoses       Annual physical exam    -  Primary    Screening for cardiovascular condition        Relevant Orders    CBC and differential    Screening for metabolic disorder        Relevant Orders    Comprehensive metabolic panel    Screening for lipid disorders        Relevant Orders    Lipid panel    Family history of hypothyroidism        Relevant Orders    TSH, 3rd generation with Free T4 reflex    Screening for diabetes mellitus        Relevant Orders    Hemoglobin A1C    Vitamin D deficiency        Relevant Orders    Vitamin D 25 hydroxy    Posterior auricular lymphadenopathy        Relevant Orders    US head neck soft tissue            Immunizations and preventive care screenings were discussed with patient today. Appropriate education was printed on patient's after visit summary. Counseling:  Alcohol/drug use: discussed moderation in alcohol intake, the recommendations for healthy alcohol use, and avoidance of illicit drug use. Dental Health: discussed importance of regular tooth brushing, flossing, and dental visits. Injury prevention: discussed safety/seat belts, safety helmets, smoke detectors, carbon dioxide detectors, and smoking near bedding or upholstery. Sexual health: discussed sexually transmitted diseases, partner selection, use of condoms, avoidance of unintended pregnancy, and contraceptive alternatives. Exercise: the importance of regular exercise/physical activity was discussed.  Recommend exercise 3-5 times per week for at least 30 minutes. Depression Screening and Follow-up Plan: Patient was screened for depression during today's encounter. They screened negative with a PHQ-2 score of 0. Return in about 1 year (around 11/21/2024) for Annual physical.     Chief Complaint:     Chief Complaint   Patient presents with    [facility] Care    Physical Exam      History of Present Illness:     Adult Annual Physical   Patient here for a comprehensive physical exam. The patient reports no problems. Diet and Physical Activity  Diet/Nutrition: well balanced diet, consuming 3-5 servings of fruits/vegetables daily, and adequate fiber intake. Exercise: moderate cardiovascular exercise, 3-4 times a week on average, and 30-60 minutes on average. Depression Screening  PHQ-2/9 Depression Screening    Little interest or pleasure in doing things: 0 - not at all  Feeling down, depressed, or hopeless: 0 - not at all  PHQ-2 Score: 0  PHQ-2 Interpretation: Negative depression screen       General Health  Sleep: sleeps well and gets 7-8 hours of sleep on average. Hearing: normal - bilateral.  Vision: no vision problems. Dental: regular dental visits and brushes teeth twice daily. /GYN Health  Follows with gynecology? no   Contraceptive method: IUD placement. Review of Systems:     Review of Systems   Constitutional: Negative. Negative for chills and fatigue. HENT: Negative. Respiratory: Negative. Negative for cough and shortness of breath. Cardiovascular: Negative. Negative for chest pain. Gastrointestinal: Negative. Genitourinary: Negative. Musculoskeletal: Negative. Negative for myalgias. Neurological: Negative.        Past Medical History:     Past Medical History:   Diagnosis Date    Anxiety       Past Surgical History:     Past Surgical History:   Procedure Laterality Date    BREAST SURGERY      reduction 2019      Social History:     Social History Socioeconomic History    Marital status: Single     Spouse name: None    Number of children: None    Years of education: None    Highest education level: None   Occupational History    None   Tobacco Use    Smoking status: Never    Smokeless tobacco: Never   Vaping Use    Vaping Use: Never used   Substance and Sexual Activity    Alcohol use: Yes    Drug use: Never    Sexual activity: Yes   Other Topics Concern    None   Social History Narrative    None     Social Determinants of Health     Financial Resource Strain: Not on file   Food Insecurity: Not on file   Transportation Needs: Not on file   Physical Activity: Not on file   Stress: Not on file   Social Connections: Not on file   Intimate Partner Violence: Not on file   Housing Stability: Not on file      Family History:     Family History   Problem Relation Age of Onset    Hypothyroidism Mother     Asthma Mother     Other Mother         prediabetes    No Known Problems Father     No Known Problems Brother     Hypothyroidism Maternal Grandmother     No Known Problems Maternal Grandfather     No Known Problems Paternal Grandmother     No Known Problems Paternal Grandfather     Colon cancer Paternal Aunt         age of diagnosis around 76s    Heart disease Paternal Uncle     Alcohol abuse Neg Hx     Substance Abuse Neg Hx     Mental illness Neg Hx       Current Medications:     Current Outpatient Medications   Medication Sig Dispense Refill    DULoxetine (CYMBALTA) 60 mg delayed release capsule Take 1 capsule (60 mg total) by mouth daily 90 capsule 3     No current facility-administered medications for this visit. Allergies: Allergies   Allergen Reactions    Pollen Extract Nasal Congestion    Latex Rash      Physical Exam:     /72   Pulse 105   Temp 98.7 °F (37.1 °C) (Oral)   Resp 16   Ht 5' 1" (1.549 m)   Wt 57.4 kg (126 lb 9.6 oz)   SpO2 99%   BMI 23.92 kg/m²     Physical Exam  Vitals and nursing note reviewed.    Constitutional: General: She is not in acute distress. Appearance: Normal appearance. She is well-developed. She is not ill-appearing. HENT:      Head: Normocephalic and atraumatic. Right Ear: Tympanic membrane, ear canal and external ear normal.      Left Ear: Tympanic membrane, ear canal and external ear normal.   Eyes:      Conjunctiva/sclera: Conjunctivae normal.   Neck:      Vascular: No carotid bruit. Cardiovascular:      Rate and Rhythm: Normal rate and regular rhythm. Pulses: Normal pulses. Heart sounds: Normal heart sounds. No murmur heard. Pulmonary:      Effort: Pulmonary effort is normal. No respiratory distress. Breath sounds: Normal breath sounds. No wheezing. Abdominal:      General: There is no distension. Palpations: Abdomen is soft. There is no mass. Tenderness: There is no abdominal tenderness. There is no guarding or rebound. Hernia: No hernia is present. Musculoskeletal:         General: Normal range of motion. Cervical back: Normal range of motion and neck supple. Right lower leg: No edema. Left lower leg: No edema. Skin:     General: Skin is warm and dry. Capillary Refill: Capillary refill takes less than 2 seconds. Neurological:      Mental Status: She is alert and oriented to person, place, and time. Mental status is at baseline. Motor: No weakness. Gait: Gait normal.   Psychiatric:         Mood and Affect: Mood normal.         Behavior: Behavior normal.         Thought Content:  Thought content normal.         Judgment: Judgment normal.          Evelio Camarillo

## 2023-11-29 ENCOUNTER — APPOINTMENT (OUTPATIENT)
Dept: LAB | Facility: CLINIC | Age: 23
End: 2023-11-29
Payer: COMMERCIAL

## 2023-11-29 DIAGNOSIS — E55.9 VITAMIN D DEFICIENCY: ICD-10-CM

## 2023-11-29 DIAGNOSIS — Z13.1 SCREENING FOR DIABETES MELLITUS: ICD-10-CM

## 2023-11-29 DIAGNOSIS — Z13.220 SCREENING FOR LIPID DISORDERS: ICD-10-CM

## 2023-11-29 DIAGNOSIS — Z13.228 SCREENING FOR METABOLIC DISORDER: ICD-10-CM

## 2023-11-29 DIAGNOSIS — Z13.6 SCREENING FOR CARDIOVASCULAR CONDITION: ICD-10-CM

## 2023-11-29 DIAGNOSIS — Z83.49 FAMILY HISTORY OF HYPOTHYROIDISM: ICD-10-CM

## 2023-11-29 LAB
25(OH)D3 SERPL-MCNC: 31 NG/ML (ref 30–100)
ALBUMIN SERPL BCP-MCNC: 4.4 G/DL (ref 3.5–5)
ALP SERPL-CCNC: 46 U/L (ref 34–104)
ALT SERPL W P-5'-P-CCNC: 11 U/L (ref 7–52)
ANION GAP SERPL CALCULATED.3IONS-SCNC: 5 MMOL/L
AST SERPL W P-5'-P-CCNC: 15 U/L (ref 13–39)
BASOPHILS # BLD AUTO: 0.05 THOUSANDS/ÂΜL (ref 0–0.1)
BASOPHILS NFR BLD AUTO: 1 % (ref 0–1)
BILIRUB SERPL-MCNC: 0.45 MG/DL (ref 0.2–1)
BUN SERPL-MCNC: 12 MG/DL (ref 5–25)
CALCIUM SERPL-MCNC: 9.5 MG/DL (ref 8.4–10.2)
CHLORIDE SERPL-SCNC: 103 MMOL/L (ref 96–108)
CHOLEST SERPL-MCNC: 163 MG/DL
CO2 SERPL-SCNC: 29 MMOL/L (ref 21–32)
CREAT SERPL-MCNC: 0.63 MG/DL (ref 0.6–1.3)
EOSINOPHIL # BLD AUTO: 0.18 THOUSAND/ÂΜL (ref 0–0.61)
EOSINOPHIL NFR BLD AUTO: 5 % (ref 0–6)
ERYTHROCYTE [DISTWIDTH] IN BLOOD BY AUTOMATED COUNT: 12.1 % (ref 11.6–15.1)
EST. AVERAGE GLUCOSE BLD GHB EST-MCNC: 105 MG/DL
GFR SERPL CREATININE-BSD FRML MDRD: 126 ML/MIN/1.73SQ M
GLUCOSE P FAST SERPL-MCNC: 91 MG/DL (ref 65–99)
HBA1C MFR BLD: 5.3 %
HCT VFR BLD AUTO: 43.5 % (ref 34.8–46.1)
HDLC SERPL-MCNC: 78 MG/DL
HGB BLD-MCNC: 14.3 G/DL (ref 11.5–15.4)
IMM GRANULOCYTES # BLD AUTO: 0 THOUSAND/UL (ref 0–0.2)
IMM GRANULOCYTES NFR BLD AUTO: 0 % (ref 0–2)
LDLC SERPL CALC-MCNC: 74 MG/DL (ref 0–100)
LYMPHOCYTES # BLD AUTO: 1.54 THOUSANDS/ÂΜL (ref 0.6–4.47)
LYMPHOCYTES NFR BLD AUTO: 42 % (ref 14–44)
MCH RBC QN AUTO: 30.4 PG (ref 26.8–34.3)
MCHC RBC AUTO-ENTMCNC: 32.9 G/DL (ref 31.4–37.4)
MCV RBC AUTO: 93 FL (ref 82–98)
MONOCYTES # BLD AUTO: 0.34 THOUSAND/ÂΜL (ref 0.17–1.22)
MONOCYTES NFR BLD AUTO: 9 % (ref 4–12)
NEUTROPHILS # BLD AUTO: 1.59 THOUSANDS/ÂΜL (ref 1.85–7.62)
NEUTS SEG NFR BLD AUTO: 43 % (ref 43–75)
NONHDLC SERPL-MCNC: 85 MG/DL
NRBC BLD AUTO-RTO: 0 /100 WBCS
PLATELET # BLD AUTO: 241 THOUSANDS/UL (ref 149–390)
PMV BLD AUTO: 9.6 FL (ref 8.9–12.7)
POTASSIUM SERPL-SCNC: 4.1 MMOL/L (ref 3.5–5.3)
PROT SERPL-MCNC: 7.5 G/DL (ref 6.4–8.4)
RBC # BLD AUTO: 4.7 MILLION/UL (ref 3.81–5.12)
SODIUM SERPL-SCNC: 137 MMOL/L (ref 135–147)
TRIGL SERPL-MCNC: 56 MG/DL
TSH SERPL DL<=0.05 MIU/L-ACNC: 1.23 UIU/ML (ref 0.45–4.5)
WBC # BLD AUTO: 3.7 THOUSAND/UL (ref 4.31–10.16)

## 2023-11-29 PROCEDURE — 85025 COMPLETE CBC W/AUTO DIFF WBC: CPT

## 2023-11-29 PROCEDURE — 36415 COLL VENOUS BLD VENIPUNCTURE: CPT

## 2023-11-29 PROCEDURE — 80053 COMPREHEN METABOLIC PANEL: CPT

## 2023-11-29 PROCEDURE — 80061 LIPID PANEL: CPT

## 2023-11-29 PROCEDURE — 83036 HEMOGLOBIN GLYCOSYLATED A1C: CPT

## 2023-11-29 PROCEDURE — 84443 ASSAY THYROID STIM HORMONE: CPT

## 2023-11-29 PROCEDURE — 82306 VITAMIN D 25 HYDROXY: CPT

## 2023-11-30 DIAGNOSIS — D72.819 LEUKOPENIA, UNSPECIFIED TYPE: Primary | ICD-10-CM

## 2023-12-26 ENCOUNTER — HOSPITAL ENCOUNTER (EMERGENCY)
Facility: HOSPITAL | Age: 23
Discharge: HOME | End: 2023-12-26
Attending: EMERGENCY MEDICINE | Admitting: EMERGENCY MEDICINE
Payer: OTHER MISCELLANEOUS

## 2023-12-26 VITALS
TEMPERATURE: 97.8 F | OXYGEN SATURATION: 97 % | DIASTOLIC BLOOD PRESSURE: 91 MMHG | RESPIRATION RATE: 16 BRPM | HEART RATE: 86 BPM | SYSTOLIC BLOOD PRESSURE: 133 MMHG
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
## 2023-12-26 LAB — ALT SERPL W P-5'-P-CCNC: 13 U/L (ref 7–52)

## 2023-12-26 PROCEDURE — 86803 HEPATITIS C AB TEST: CPT

## 2023-12-26 PROCEDURE — 99284 EMERGENCY DEPT VISIT MOD MDM: CPT

## 2023-12-26 PROCEDURE — 84460 ALANINE AMINO (ALT) (SGPT): CPT

## 2023-12-26 PROCEDURE — 99283 EMERGENCY DEPT VISIT LOW MDM: CPT

## 2023-12-26 PROCEDURE — 87389 HIV-1 AG W/HIV-1&-2 AB AG IA: CPT

## 2023-12-26 PROCEDURE — 86706 HEP B SURFACE ANTIBODY: CPT

## 2023-12-26 PROCEDURE — 36415 COLL VENOUS BLD VENIPUNCTURE: CPT

## 2023-12-26 PROCEDURE — 87340 HEPATITIS B SURFACE AG IA: CPT

## 2023-12-26 NOTE — ED PROVIDER NOTES
Emergency Department Employee Health Note  Suly Cartagena 23 y.o. 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 Pt reports small amounts of bleeding.      HPI:  Suly Cartagena is a 23 y.o. female who presents with needlestick injury from the OR. Pt was stuck with a suture needle that was already being used on the patient.      HPI  Review of Systems   Skin:  Positive for wound.   All other systems reviewed and are negative.      Historical Information     Immunizations:   Immunization History   Administered Date(s) Administered    COVID-19 MODERNA VACC 0.5 ML IM 01/14/2021    COVID-19 PFIZER VACCINE 0.3 ML IM 01/14/2021, 02/06/2021, 12/01/2021    DTaP 2000, 2000, 2000, 07/23/2001, 02/15/2005    DTaP,unspecified 2000, 2000, 2000, 07/23/2001, 02/15/2005    H1N1, All Formulations 12/16/2009, 01/13/2010    Hep B, Adolescent or Pediatric 2000, 2000, 2000    Hep B, adult 09/01/2021    HiB 2000, 2000, 2000, 02/09/2001    Hib (HbOC) 2000, 2000, 2000, 02/09/2001    INFLUENZA 10/15/2017, 10/20/2021, 09/29/2022, 10/04/2023    IPV 2000, 2000, 07/23/2001, 02/15/2005    Influenza, seasonal, injectable, preservative free 10/15/2017    MMR 02/09/2001, 02/15/2005    Meningococcal C Conjugate 05/10/2011, 06/01/2017    Meningococcal MCV4, Unspecified 05/10/2011, 06/01/2017    Pneumococcal Conjugate 13-Valent 2000, 2000, 2000, 02/09/2001    Tdap 05/10/2011, 07/01/2021, 07/01/2021    Varicella 05/02/2001, 01/13/2010       Past Medical History:   Diagnosis Date    Anxiety        Family History   Problem Relation Age of Onset    Hypothyroidism Mother     Asthma Mother     Other Mother          prediabetes    No Known Problems Father     No Known Problems Brother     Hypothyroidism Maternal Grandmother     No Known Problems Maternal Grandfather     No Known Problems Paternal Grandmother     No Known Problems Paternal Grandfather     Colon cancer Paternal Aunt         age of diagnosis around 70s    Heart disease Paternal Uncle     Alcohol abuse Neg Hx     Substance Abuse Neg Hx     Mental illness Neg Hx      Past Surgical History:   Procedure Laterality Date    BREAST SURGERY      reduction 2019     Social History     Tobacco Use    Smoking status: Never    Smokeless tobacco: Never   Vaping Use    Vaping status: Never Used   Substance Use Topics    Alcohol use: Yes    Drug use: Never     E-Cigarette/Vaping    E-Cigarette Use Never User      E-Cigarette/Vaping Substances    Nicotine No     CBD No          Meds/Allergies   Prior to Admission Medications   Prescriptions Last Dose Informant Patient Reported? Taking?   DULoxetine (CYMBALTA) 60 mg delayed release capsule   No No   Sig: Take 1 capsule (60 mg total) by mouth daily      Facility-Administered Medications: None       Allergies   Allergen Reactions    Pollen Extract Nasal Congestion    Latex Rash       PHYSICAL EXAM  Physical Exam  Vitals and nursing note reviewed.   Constitutional:       General: She is not in acute distress.     Appearance: Normal appearance. She is well-developed. She is not ill-appearing.   HENT:      Head: Normocephalic and atraumatic.   Eyes:      Conjunctiva/sclera: Conjunctivae normal.   Cardiovascular:      Rate and Rhythm: Normal rate.   Pulmonary:      Effort: Pulmonary effort is normal.   Musculoskeletal:         General: Normal range of motion.      Cervical back: Normal range of motion and neck supple.   Skin:     General: Skin is warm and dry.      Comments: Small puncture wound present. Bleeding controlled    Neurological:      Mental Status: She is alert.   Psychiatric:         Mood and Affect: Mood normal.          "Behavior: Behavior normal.         Vital Signs  ED Triage Vitals [12/26/23 1812]   Temperature Pulse Respirations Blood Pressure SpO2   97.8 °F (36.6 °C) 86 16 133/91 97 %      Temp Source Heart Rate Source Patient Position - Orthostatic VS BP Location FiO2 (%)   Oral Monitor -- -- --      Pain Score       --           Vitals:    12/26/23 1812   BP: 133/91   Pulse: 86         Certification of Exposure:    (link to Employee Health Services: [facility] ([email or web address]): find link to BBP Exposure Instructions under important links on center of the page)    The patient is stable and has a history and physical exam consistent with an Blood Exposure, Body Fluid Exposure, and Sharp Injury and based on my assessment this exposure is Significant     If “NonSignificant” nothing further needs to be filled out.  If \"Significant\" please continue with the following questions    For Significant Exposures All of the following items must be completed:     Source Patient Name: Lula Brink   Source Patient MRN: [id number]  Was the Source Patient's Provider contacted Yes, Providers name: Dr. French   Was \"Exposure Panel - Source\" ordered (including Rapid HIV, HBsAg and anti-HCV) for Source Patient: Yes    Exposure Information    Type of Body Fluid Exposure: blood and blood products    Estimated volume of fluid: small up to 5cc     Exposure area:  nonintact skin    Skin Integrity: punctured    ED provider should review source patient chart for known history of HIV, Hepatitis B and Hepatitis C infection    Source patient HIV positive: No  Was the On-call Infectious Disease Provider contacted: No  Discussed treatment options with/for employee: Yes    Immunization History   Administered Date(s) Administered    COVID-19 MODERNA VACC 0.5 ML IM 01/14/2021    COVID-19 PFIZER VACCINE 0.3 ML IM 01/14/2021, 02/06/2021, 12/01/2021    DTaP 2000, 2000, 2000, 07/23/2001, 02/15/2005    DTaP,unspecified 2000, " 2000, 2000, 07/23/2001, 02/15/2005    H1N1, All Formulations 12/16/2009, 01/13/2010    Hep B, Adolescent or Pediatric 2000, 2000, 2000    Hep B, adult 09/01/2021    HiB 2000, 2000, 2000, 02/09/2001    Hib (HbOC) 2000, 2000, 2000, 02/09/2001    INFLUENZA 10/15/2017, 10/20/2021, 09/29/2022, 10/04/2023    IPV 2000, 2000, 07/23/2001, 02/15/2005    Influenza, seasonal, injectable, preservative free 10/15/2017    MMR 02/09/2001, 02/15/2005    Meningococcal C Conjugate 05/10/2011, 06/01/2017    Meningococcal MCV4, Unspecified 05/10/2011, 06/01/2017    Pneumococcal Conjugate 13-Valent 2000, 2000, 2000, 02/09/2001    Tdap 05/10/2011, 07/01/2021, 07/01/2021    Varicella 05/02/2001, 01/13/2010       Hepatitis B Vaccine History:    Immunization History   Administered Date(s) Administered    COVID-19 MODERNA VACC 0.5 ML IM 01/14/2021    COVID-19 PFIZER VACCINE 0.3 ML IM 01/14/2021, 02/06/2021, 12/01/2021    DTaP 2000, 2000, 2000, 07/23/2001, 02/15/2005    DTaP,unspecified 2000, 2000, 2000, 07/23/2001, 02/15/2005    H1N1, All Formulations 12/16/2009, 01/13/2010    Hep B, Adolescent or Pediatric 2000, 2000, 2000    Hep B, adult 09/01/2021    HiB 2000, 2000, 2000, 02/09/2001    Hib (HbOC) 2000, 2000, 2000, 02/09/2001    INFLUENZA 10/15/2017, 10/20/2021, 09/29/2022, 10/04/2023    IPV 2000, 2000, 07/23/2001, 02/15/2005    Influenza, seasonal, injectable, preservative free 10/15/2017    MMR 02/09/2001, 02/15/2005    Meningococcal C Conjugate 05/10/2011, 06/01/2017    Meningococcal MCV4, Unspecified 05/10/2011, 06/01/2017    Pneumococcal Conjugate 13-Valent 2000, 2000, 2000, 02/09/2001    Tdap 05/10/2011, 07/01/2021, 07/01/2021    Varicella 05/02/2001, 01/13/2010       The patient has Previously been vaccinated for  "Hepatitis B.     HEPATITIS B IMMUNE GLOBULIN:  Not Indicated    Tetanus Vaccine History:    TDAP vaccination date: 7/1/2021    The patient has Tdap reported as up to date    Employee/Patient post exposure testing Has been performed.     \"Exposure Panel - Employee Baseline\"  (includes HBsAg, HBsAb, anti-HCV, ALT, HIV) with verbal consent and pretesting counseling for the patient Has been ordered.    Post-exposure Prophylaxis treatment options were discussed today: Patient declined      Visual Acuity      ED Medications  Medications - No data to display    Diagnostic Studies  Results Reviewed       Procedure Component Value Units Date/Time    Hepatitis B surface antigen [[id number]] Collected: 12/26/23 1917    Lab Status: No result Specimen: Blood from Arm, Left     Hepatitis C antibody [[id number]] Collected: 12/26/23 1917    Lab Status: No result Specimen: Blood from Arm, Left     HIV 1/2 AG/AB w Reflex [facility] for 2 yr old and above [[id number]] Collected: 12/26/23 1917    Lab Status: No result Specimen: Blood from Arm, Left     ALT [[id number]] Collected: 12/26/23 1917    Lab Status: No result Specimen: Blood from Arm, Left     Hepatitis B surface antibody [[id number]] Collected: 12/26/23 1917    Lab Status: No result Specimen: Blood from Arm, Left                No orders to display              Procedures  Procedures         Medical Decision Making  Amount and/or Complexity of Data Reviewed  Labs: ordered.        Disposition  Final diagnoses:   Needlestick injury accident with exposure to body fluid     Time reflects when diagnosis was documented in both MDM as applicable and the Disposition within this note       Time User Action Codes Description Comment    12/26/2023  7:15 PM My [place] Add [W46.1XXA] Needlestick injury accident with exposure to body fluid           ED Disposition       ED Disposition   Discharge    Condition   Stable    Date/Time   Tue Dec 26, 2023  7:14 PM    Comment   Suly Cartagena discharge " to home/self care.                   Follow-up Information    None         Patient's Medications   Discharge Prescriptions    No medications on file       No discharge procedures on file.    PDMP Review       None              ED Provider  Electronically Signed by               My [place] PA-C  12/26/23 1918

## 2023-12-27 LAB
HBV SURFACE AB SER-ACNC: 79.8 MIU/ML
HBV SURFACE AG SER QL: NORMAL
HCV AB SER QL: NORMAL
HIV 1+2 AB+HIV1 P24 AG SERPL QL IA: NORMAL
HIV 2 AB SERPL QL IA: NORMAL
HIV1 AB SERPL QL IA: NORMAL
HIV1 P24 AG SERPL QL IA: NORMAL

## 2024-01-02 ENCOUNTER — APPOINTMENT (OUTPATIENT)
Dept: LAB | Facility: MEDICAL CENTER | Age: 24
End: 2024-01-02
Payer: COMMERCIAL

## 2024-01-02 DIAGNOSIS — D72.819 LEUKOPENIA, UNSPECIFIED TYPE: ICD-10-CM

## 2024-01-02 DIAGNOSIS — Z77.21 EXPOSURE TO BLOOD-BORNE PATHOGEN: Primary | ICD-10-CM

## 2024-01-02 LAB
BASOPHILS # BLD AUTO: 0.06 THOUSANDS/ÂΜL (ref 0–0.1)
BASOPHILS NFR BLD AUTO: 1 % (ref 0–1)
EOSINOPHIL # BLD AUTO: 0.17 THOUSAND/ÂΜL (ref 0–0.61)
EOSINOPHIL NFR BLD AUTO: 3 % (ref 0–6)
ERYTHROCYTE [DISTWIDTH] IN BLOOD BY AUTOMATED COUNT: 12.3 % (ref 11.6–15.1)
HCT VFR BLD AUTO: 42.8 % (ref 34.8–46.1)
HGB BLD-MCNC: 14.2 G/DL (ref 11.5–15.4)
IMM GRANULOCYTES # BLD AUTO: 0.01 THOUSAND/UL (ref 0–0.2)
IMM GRANULOCYTES NFR BLD AUTO: 0 % (ref 0–2)
LYMPHOCYTES # BLD AUTO: 0.93 THOUSANDS/ÂΜL (ref 0.6–4.47)
LYMPHOCYTES NFR BLD AUTO: 17 % (ref 14–44)
MCH RBC QN AUTO: 30.7 PG (ref 26.8–34.3)
MCHC RBC AUTO-ENTMCNC: 33.2 G/DL (ref 31.4–37.4)
MCV RBC AUTO: 93 FL (ref 82–98)
MONOCYTES # BLD AUTO: 0.57 THOUSAND/ÂΜL (ref 0.17–1.22)
MONOCYTES NFR BLD AUTO: 10 % (ref 4–12)
NEUTROPHILS # BLD AUTO: 3.81 THOUSANDS/ÂΜL (ref 1.85–7.62)
NEUTS SEG NFR BLD AUTO: 69 % (ref 43–75)
NRBC BLD AUTO-RTO: 0 /100 WBCS
PLATELET # BLD AUTO: 284 THOUSANDS/UL (ref 149–390)
PMV BLD AUTO: 9.9 FL (ref 8.9–12.7)
RBC # BLD AUTO: 4.62 MILLION/UL (ref 3.81–5.12)
WBC # BLD AUTO: 5.55 THOUSAND/UL (ref 4.31–10.16)

## 2024-01-02 PROCEDURE — 36415 COLL VENOUS BLD VENIPUNCTURE: CPT

## 2024-01-02 PROCEDURE — 85025 COMPLETE CBC W/AUTO DIFF WBC: CPT

## 2024-01-05 DIAGNOSIS — D72.819 LEUKOPENIA, UNSPECIFIED TYPE: Primary | ICD-10-CM

## 2024-01-08 ENCOUNTER — OFFICE VISIT (OUTPATIENT)
Dept: URGENT CARE | Facility: MEDICAL CENTER | Age: 24
End: 2024-01-08
Payer: COMMERCIAL

## 2024-01-08 VITALS
WEIGHT: 129 LBS | HEIGHT: 61 IN | SYSTOLIC BLOOD PRESSURE: 127 MMHG | RESPIRATION RATE: 16 BRPM | DIASTOLIC BLOOD PRESSURE: 84 MMHG | OXYGEN SATURATION: 98 % | TEMPERATURE: 97.9 F | BODY MASS INDEX: 24.35 KG/M2 | HEART RATE: 82 BPM

## 2024-01-08 DIAGNOSIS — H69.93 ACUTE DYSFUNCTION OF EUSTACHIAN TUBE, BILATERAL: Primary | ICD-10-CM

## 2024-01-08 PROCEDURE — 99213 OFFICE O/P EST LOW 20 MIN: CPT | Performed by: PHYSICIAN ASSISTANT

## 2024-01-08 RX ORDER — PREDNISONE 10 MG/1
TABLET ORAL
Qty: 18 TABLET | Refills: 0 | Status: SHIPPED | OUTPATIENT
Start: 2024-01-08

## 2024-01-08 NOTE — PROGRESS NOTES
[facility]        NAME: Suly Cartagena is a 23 y.o. female  : 2000    MRN: [id number]  DATE: 2024  TIME: 12:14 PM    Assessment and Plan   Acute dysfunction of Eustachian tube, bilateral [H69.93]  1. Acute dysfunction of Eustachian tube, bilateral  predniSONE 10 mg tablet            Patient Instructions       Follow up with PCP if no improvement.    Chief Complaint     Chief Complaint   Patient presents with    Cold Like Symptoms     Pt c/o cough productive for clear sputum, nasal congestion, runny nose and sore throat for he past 9 days.  Taking OTC meds with some relief.  Took home covid test - negative         History of Present Illness       Patient with history of allergies.  She takes Zyrtec.  Over the last few weeks she has had continuing coughing that is nonproductive and increases when she lies down.    Cough  This is a new problem. The current episode started 1 to 4 weeks ago. The problem has been unchanged. The cough is Non-productive. Associated symptoms include nasal congestion, postnasal drip and rhinorrhea. Pertinent negatives include no chest pain, chills, ear congestion, ear pain, fever, headaches, heartburn, hemoptysis, myalgias, rash, sore throat, shortness of breath, sweats, weight loss or wheezing. The symptoms are aggravated by lying down. Her past medical history is significant for environmental allergies.       Review of Systems   Review of Systems   Constitutional:  Negative for chills, fever and weight loss.   HENT:  Positive for postnasal drip and rhinorrhea. Negative for ear pain and sore throat.    Respiratory:  Positive for cough. Negative for hemoptysis, shortness of breath and wheezing.    Cardiovascular:  Negative for chest pain.   Gastrointestinal:  Negative for heartburn.   Musculoskeletal:  Negative for myalgias.   Skin:  Negative for rash.   Allergic/Immunologic: Positive for environmental allergies.   Neurological:  Negative for headaches.   All  "other systems reviewed and are negative.        Current Medications       Current Outpatient Medications:     DULoxetine (CYMBALTA) 60 mg delayed release capsule, Take 1 capsule (60 mg total) by mouth daily, Disp: 90 capsule, Rfl: 3    predniSONE 10 mg tablet, 4 x 3 days, 3 x 1, 2 x 1, 1 x 1, Disp: 18 tablet, Rfl: 0    Current Allergies     Allergies as of 01/08/2024 - Reviewed 01/08/2024   Allergen Reaction Noted    Pollen extract Nasal Congestion 11/21/2023    Latex Rash 11/21/2023            The following portions of the patient's history were reviewed and updated as appropriate: allergies, current medications, past family history, past medical history, past social history, past surgical history and problem list.     Past Medical History:   Diagnosis Date    Anxiety        Past Surgical History:   Procedure Laterality Date    BREAST SURGERY      reduction 2019       Family History   Problem Relation Age of Onset    Hypothyroidism Mother     Asthma Mother     Other Mother         prediabetes    No Known Problems Father     No Known Problems Brother     Hypothyroidism Maternal Grandmother     No Known Problems Maternal Grandfather     No Known Problems Paternal Grandmother     No Known Problems Paternal Grandfather     Colon cancer Paternal Aunt         age of diagnosis around 70s    Heart disease Paternal Uncle     Alcohol abuse Neg Hx     Substance Abuse Neg Hx     Mental illness Neg Hx          Medications have been verified.        Objective   /84   Pulse 82   Temp 97.9 °F (36.6 °C) (Tympanic)   Resp 16   Ht 5' 1\" (1.549 m)   Wt 58.5 kg (129 lb)   LMP 12/26/2023 (Exact Date)   SpO2 98%   BMI 24.37 kg/m²   Patient's last menstrual period was 12/26/2023 (exact date).       Physical Exam     Physical Exam  Vitals and nursing note reviewed.   Constitutional:       Appearance: Normal appearance. She is normal weight.   HENT:      Right Ear: Ear canal and external ear normal.      Left Ear: Ear canal " and external ear normal.      Nose: Congestion and rhinorrhea present.      Mouth/Throat:      Mouth: Mucous membranes are moist.   Cardiovascular:      Rate and Rhythm: Normal rate and regular rhythm.      Pulses: Normal pulses.      Heart sounds: Normal heart sounds.   Pulmonary:      Effort: Pulmonary effort is normal.      Breath sounds: Normal breath sounds.   Lymphadenopathy:      Cervical: No cervical adenopathy.   Neurological:      Mental Status: She is alert.   Psychiatric:         Mood and Affect: Mood normal.         Behavior: Behavior normal.     TMs bulging.  Nasal mucosa boggy.

## 2024-01-16 ENCOUNTER — HOSPITAL ENCOUNTER (OUTPATIENT)
Dept: ULTRASOUND IMAGING | Facility: MEDICAL CENTER | Age: 24
Discharge: HOME | End: 2024-01-16
Payer: COMMERCIAL

## 2024-01-16 DIAGNOSIS — R59.0 POSTERIOR AURICULAR LYMPHADENOPATHY: ICD-10-CM

## 2024-01-16 PROCEDURE — 76536 US EXAM OF HEAD AND NECK: CPT

## 2024-02-06 ENCOUNTER — APPOINTMENT (OUTPATIENT)
Dept: LAB | Facility: MEDICAL CENTER | Age: 24
End: 2024-02-06

## 2024-02-06 DIAGNOSIS — Z77.21 EXPOSURE TO BLOOD-BORNE PATHOGEN: ICD-10-CM

## 2024-02-06 DIAGNOSIS — D72.819 LEUKOPENIA, UNSPECIFIED TYPE: ICD-10-CM

## 2024-02-06 LAB
BASOPHILS # BLD AUTO: 0.04 THOUSANDS/ÂΜL (ref 0–0.1)
BASOPHILS NFR BLD AUTO: 1 % (ref 0–1)
EOSINOPHIL # BLD AUTO: 0.15 THOUSAND/ÂΜL (ref 0–0.61)
EOSINOPHIL NFR BLD AUTO: 2 % (ref 0–6)
ERYTHROCYTE [DISTWIDTH] IN BLOOD BY AUTOMATED COUNT: 12.6 % (ref 11.6–15.1)
HCT VFR BLD AUTO: 43.2 % (ref 34.8–46.1)
HGB BLD-MCNC: 14.3 G/DL (ref 11.5–15.4)
HIV 1+2 AB+HIV1 P24 AG SERPL QL IA: NORMAL
HIV 2 AB SERPL QL IA: NORMAL
HIV1 AB SERPL QL IA: NORMAL
HIV1 P24 AG SERPL QL IA: NORMAL
IMM GRANULOCYTES # BLD AUTO: 0.04 THOUSAND/UL (ref 0–0.2)
IMM GRANULOCYTES NFR BLD AUTO: 1 % (ref 0–2)
LYMPHOCYTES # BLD AUTO: 1.36 THOUSANDS/ÂΜL (ref 0.6–4.47)
LYMPHOCYTES NFR BLD AUTO: 15 % (ref 14–44)
MCH RBC QN AUTO: 31 PG (ref 26.8–34.3)
MCHC RBC AUTO-ENTMCNC: 33.1 G/DL (ref 31.4–37.4)
MCV RBC AUTO: 94 FL (ref 82–98)
MONOCYTES # BLD AUTO: 0.85 THOUSAND/ÂΜL (ref 0.17–1.22)
MONOCYTES NFR BLD AUTO: 10 % (ref 4–12)
NEUTROPHILS # BLD AUTO: 6.38 THOUSANDS/ÂΜL (ref 1.85–7.62)
NEUTS SEG NFR BLD AUTO: 71 % (ref 43–75)
NRBC BLD AUTO-RTO: 0 /100 WBCS
PLATELET # BLD AUTO: 285 THOUSANDS/UL (ref 149–390)
PMV BLD AUTO: 10 FL (ref 8.9–12.7)
RBC # BLD AUTO: 4.61 MILLION/UL (ref 3.81–5.12)
WBC # BLD AUTO: 8.82 THOUSAND/UL (ref 4.31–10.16)

## 2024-02-06 PROCEDURE — 87389 HIV-1 AG W/HIV-1&-2 AB AG IA: CPT

## 2024-02-06 PROCEDURE — 36415 COLL VENOUS BLD VENIPUNCTURE: CPT

## 2024-02-06 PROCEDURE — 85025 COMPLETE CBC W/AUTO DIFF WBC: CPT

## 2024-02-19 PROBLEM — Z01.419 GYNECOLOGIC EXAM NORMAL: Status: ACTIVE | Noted: 2024-02-19

## 2024-02-19 NOTE — ASSESSMENT & PLAN NOTE
Reviewed Mirena IUD now approved for 8 years of use for birth control. 5 years for bleeding control, if has breakthrough bleeding after 5 year andreia can get replaced sooner.   Pap guidelines reviewed. Pap with reflex done today. STD cultures done today.   Return to office for annual or as needed.

## 2024-02-19 NOTE — PROGRESS NOTES
Assessment/Plan   Problem List Items Addressed This Visit          Other    Gynecologic exam normal - Primary     Reviewed Mirena IUD now approved for 8 years of use for birth control. 5 years for bleeding control, if has breakthrough bleeding after 5 year andreia can get replaced sooner.   Pap guidelines reviewed. Pap with reflex done today. STD cultures done today.   Return to office for annual or as needed.          Relevant Orders    Liquid-based pap, screening     Other Visit Diagnoses       Screening examination for venereal disease        Relevant Orders    Chlamydia/GC amplified DNA by PCR            Subjective:     Patient ID: Suly Cartagena is a 24 y.o. y.o. female.    HPI  25 yo seen for annual exam. Currently has Mirena IUD inserted 4/26/2021. Does get menses, monthly, light. Denies bowel or bladder issues.   Would like STD cultures done today.   Patient is an orthopedic PA.   Last pap: 4/26/2021 NILM  The following portions of the patient's history were reviewed and updated as appropriate: She  has a past medical history of Anxiety, Chlamydia (2017), and Depression.  She   Patient Active Problem List    Diagnosis Date Noted    Gynecologic exam normal 02/19/2024    Seasonal allergies 11/21/2023    Gastroesophageal reflux disease 02/11/2023    Anxiety 04/23/2021     She  has a past surgical history that includes Breast surgery.  Her family history includes Asthma in her mother; Colon cancer in her paternal aunt; Diabetes in her mother; Heart disease in her father, paternal grandfather, paternal grandmother, and paternal uncle; Hyperlipidemia in her maternal grandmother and mother; Hypothyroidism in her maternal grandmother and mother; No Known Problems in her brother and maternal grandfather; Other in her mother; Thyroid disease in her maternal grandmother and mother.  She  reports that she has never smoked. She has never used smokeless tobacco. She reports current alcohol use of about 1.0 - 2.0 standard  "drink of alcohol per week. She reports that she does not use drugs.  Current Outpatient Medications   Medication Sig Dispense Refill    DULoxetine (CYMBALTA) 60 mg delayed release capsule Take 1 capsule (60 mg total) by mouth daily 90 capsule 3    Levonorgestrel (Mirena, 52 MG,) 20 MCG/DAY IUD 1 each by Intrauterine route Once every 8 years       No current facility-administered medications for this visit.     She is allergic to pollen extract and latex..    Menstrual History:  OB History    No obstetric history on file.          Patient's last menstrual period was 02/08/2024.         Review of Systems   Constitutional:  Negative for fatigue, fever and unexpected weight change.   HENT:  Negative for dental problem and sinus pressure.    Eyes:  Negative for visual disturbance.   Respiratory:  Negative for cough, shortness of breath and wheezing.    Cardiovascular:  Negative for chest pain.   Gastrointestinal:  Negative for abdominal pain, blood in stool, constipation, diarrhea, nausea and vomiting.   Endocrine: Negative for polydipsia.   Genitourinary:  Negative for difficulty urinating, dyspareunia, dysuria, frequency, hematuria, pelvic pain and urgency.   Musculoskeletal:  Negative for arthralgias and back pain.   Neurological:  Negative for dizziness, seizures, light-headedness and headaches.   Psychiatric/Behavioral:  Negative for suicidal ideas. The patient is not nervous/anxious.        Objective:  Vitals:    02/20/24 0735   BP: 110/68   Weight: 60.6 kg (133 lb 9.6 oz)   Height: 5' 1.5\" (1.562 m)      Physical Exam  Constitutional:       Appearance: Normal appearance. She is well-developed.   Genitourinary:      Vulva and bladder normal.      No lesions in the vagina.      Right Labia: No rash, tenderness, lesions or skin changes.     Left Labia: No tenderness, lesions, skin changes or rash.     No labial fusion noted.      No inguinal adenopathy present in the right or left side.     No vaginal discharge, " erythema, tenderness or bleeding.        Right Adnexa: not tender, not full and no mass present.     Left Adnexa: not tender, not full and no mass present.     No cervical motion tenderness, discharge or lesion.      IUD strings visualized.      Uterus is not enlarged, tender or irregular.      No uterine mass detected.     No urethral prolapse, tenderness or mass present.      Bladder is not tender.    Breasts:     Breasts are symmetrical.      Right: No swelling, bleeding, inverted nipple, mass, nipple discharge, skin change or tenderness.      Left: No swelling, bleeding, inverted nipple, mass, nipple discharge, skin change or tenderness.   HENT:      Head: Normocephalic and atraumatic.   Neck:      Thyroid: No thyromegaly.   Cardiovascular:      Rate and Rhythm: Normal rate and regular rhythm.      Heart sounds: Normal heart sounds. No murmur heard.     No friction rub. No gallop.   Pulmonary:      Effort: Pulmonary effort is normal. No respiratory distress.      Breath sounds: Normal breath sounds. No wheezing.   Abdominal:      General: There is no distension.      Palpations: Abdomen is soft. There is no mass.      Tenderness: There is no abdominal tenderness. There is no guarding or rebound.      Hernia: No hernia is present.   Lymphadenopathy:      Cervical: No cervical adenopathy.      Upper Body:      Right upper body: No supraclavicular, axillary or pectoral adenopathy.      Left upper body: No supraclavicular, axillary or pectoral adenopathy.      Lower Body: No right inguinal adenopathy. No left inguinal adenopathy.   Neurological:      Mental Status: She is alert and oriented to person, place, and time.   Skin:     General: Skin is warm and dry.   Psychiatric:         Behavior: Behavior normal.

## 2024-02-20 ENCOUNTER — OFFICE VISIT (OUTPATIENT)
Dept: OBGYN CLINIC | Facility: CLINIC | Age: 24
End: 2024-02-20
Payer: COMMERCIAL

## 2024-02-20 VITALS
BODY MASS INDEX: 24.59 KG/M2 | HEIGHT: 62 IN | DIASTOLIC BLOOD PRESSURE: 68 MMHG | SYSTOLIC BLOOD PRESSURE: 110 MMHG | WEIGHT: 133.6 LBS

## 2024-02-20 DIAGNOSIS — Z11.3 SCREENING EXAMINATION FOR VENEREAL DISEASE: ICD-10-CM

## 2024-02-20 DIAGNOSIS — Z01.419 GYNECOLOGIC EXAM NORMAL: Primary | ICD-10-CM

## 2024-02-20 PROCEDURE — G0476 HPV COMBO ASSAY CA SCREEN: HCPCS | Performed by: PHYSICIAN ASSISTANT

## 2024-02-20 PROCEDURE — S0612 ANNUAL GYNECOLOGICAL EXAMINA: HCPCS | Performed by: PHYSICIAN ASSISTANT

## 2024-02-20 PROCEDURE — G0145 SCR C/V CYTO,THINLAYER,RESCR: HCPCS | Performed by: PHYSICIAN ASSISTANT

## 2024-02-20 PROCEDURE — 87591 N.GONORRHOEAE DNA AMP PROB: CPT | Performed by: PHYSICIAN ASSISTANT

## 2024-02-20 PROCEDURE — 87491 CHLMYD TRACH DNA AMP PROBE: CPT | Performed by: PHYSICIAN ASSISTANT

## 2024-02-20 RX ORDER — LEVONORGESTREL 52 MG/1
1 INTRAUTERINE DEVICE INTRAUTERINE
COMMUNITY
Start: 2021-04-26

## 2024-02-21 PROBLEM — Z01.419 GYNECOLOGIC EXAM NORMAL: Status: RESOLVED | Noted: 2024-02-19 | Resolved: 2024-02-21

## 2024-02-21 LAB
HPV HR 12 DNA CVX QL NAA+PROBE: NEGATIVE
HPV16 DNA CVX QL NAA+PROBE: NEGATIVE
HPV18 DNA CVX QL NAA+PROBE: NEGATIVE

## 2024-02-22 LAB
C TRACH DNA SPEC QL NAA+PROBE: NEGATIVE
N GONORRHOEA DNA SPEC QL NAA+PROBE: NEGATIVE

## 2024-02-23 LAB
LAB AP GYN PRIMARY INTERPRETATION: NORMAL
LAB AP LMP: NORMAL
Lab: NORMAL

## 2024-03-19 ENCOUNTER — APPOINTMENT (OUTPATIENT)
Dept: LAB | Facility: MEDICAL CENTER | Age: 24
End: 2024-03-19

## 2024-03-19 DIAGNOSIS — Z77.21 EXPOSURE TO BLOOD-BORNE PATHOGEN: ICD-10-CM

## 2024-03-19 LAB — HCV AB SER QL: NORMAL

## 2024-03-19 PROCEDURE — 36415 COLL VENOUS BLD VENIPUNCTURE: CPT

## 2024-03-19 PROCEDURE — 86803 HEPATITIS C AB TEST: CPT

## 2024-03-19 PROCEDURE — 87389 HIV-1 AG W/HIV-1&-2 AB AG IA: CPT

## 2024-03-20 LAB
HIV 1+2 AB+HIV1 P24 AG SERPL QL IA: NORMAL
HIV 2 AB SERPL QL IA: NORMAL
HIV1 AB SERPL QL IA: NORMAL
HIV1 P24 AG SERPL QL IA: NORMAL

## 2024-04-08 ENCOUNTER — RX ONLY (RX ONLY)
Age: 24
End: 2024-04-08

## 2024-04-08 RX ORDER — ADAPALENE 3 MG/G
GEL TOPICAL
Qty: 45 | Refills: 0 | Status: ERX | COMMUNITY
Start: 2024-04-08

## 2024-06-25 ENCOUNTER — APPOINTMENT (OUTPATIENT)
Dept: LAB | Facility: MEDICAL CENTER | Age: 24
End: 2024-06-25
Payer: OTHER MISCELLANEOUS

## 2024-06-25 DIAGNOSIS — Z77.21 EXPOSURE TO BLOOD-BORNE PATHOGEN: ICD-10-CM

## 2024-06-25 LAB
HCV AB SER QL: NORMAL
HIV 1+2 AB+HIV1 P24 AG SERPL QL IA: NORMAL
HIV 2 AB SERPL QL IA: NORMAL
HIV1 AB SERPL QL IA: NORMAL
HIV1 P24 AG SERPL QL IA: NORMAL

## 2024-06-25 PROCEDURE — 86803 HEPATITIS C AB TEST: CPT

## 2024-06-25 PROCEDURE — 36415 COLL VENOUS BLD VENIPUNCTURE: CPT

## 2024-06-25 PROCEDURE — 87389 HIV-1 AG W/HIV-1&-2 AB AG IA: CPT

## 2024-11-11 DIAGNOSIS — F41.9 ANXIETY: ICD-10-CM

## 2024-11-12 RX ORDER — DULOXETIN HYDROCHLORIDE 60 MG/1
60 CAPSULE, DELAYED RELEASE ORAL DAILY
Qty: 90 CAPSULE | Refills: 0 | Status: SHIPPED | OUTPATIENT
Start: 2024-11-12

## 2024-12-06 ENCOUNTER — OFFICE VISIT (OUTPATIENT)
Dept: FAMILY MEDICINE CLINIC | Facility: CLINIC | Age: 24
End: 2024-12-06
Payer: COMMERCIAL

## 2024-12-06 VITALS
TEMPERATURE: 97.8 F | HEART RATE: 65 BPM | OXYGEN SATURATION: 99 % | RESPIRATION RATE: 14 BRPM | HEIGHT: 62 IN | WEIGHT: 128.4 LBS | BODY MASS INDEX: 23.63 KG/M2 | SYSTOLIC BLOOD PRESSURE: 122 MMHG | DIASTOLIC BLOOD PRESSURE: 78 MMHG

## 2024-12-06 DIAGNOSIS — Z00.00 ANNUAL PHYSICAL EXAM: Primary | ICD-10-CM

## 2024-12-06 DIAGNOSIS — K21.9 GASTROESOPHAGEAL REFLUX DISEASE WITHOUT ESOPHAGITIS: ICD-10-CM

## 2024-12-06 DIAGNOSIS — L70.9 ACNE, UNSPECIFIED ACNE TYPE: ICD-10-CM

## 2024-12-06 PROCEDURE — 99395 PREV VISIT EST AGE 18-39: CPT | Performed by: NURSE PRACTITIONER

## 2024-12-06 RX ORDER — ADAPALENE GEL USP, 0.3% 3 MG/G
GEL TOPICAL
COMMUNITY
Start: 2022-03-05 | End: 2024-12-06 | Stop reason: SDUPTHER

## 2024-12-06 RX ORDER — OMEPRAZOLE 40 MG/1
CAPSULE, DELAYED RELEASE ORAL
COMMUNITY
Start: 2021-01-05 | End: 2024-12-06 | Stop reason: SDUPTHER

## 2024-12-06 RX ORDER — ADAPALENE GEL USP, 0.3% 3 MG/G
1 GEL TOPICAL
Qty: 45 G | Refills: 3 | Status: SHIPPED | OUTPATIENT
Start: 2024-12-06

## 2024-12-06 RX ORDER — ONDANSETRON 4 MG/1
4 TABLET, FILM COATED ORAL EVERY 8 HOURS PRN
Qty: 20 TABLET | Refills: 3 | Status: SHIPPED | OUTPATIENT
Start: 2024-12-06

## 2024-12-06 RX ORDER — OMEPRAZOLE 40 MG/1
40 CAPSULE, DELAYED RELEASE ORAL DAILY
Qty: 90 CAPSULE | Refills: 3 | Status: SHIPPED | OUTPATIENT
Start: 2024-12-06

## 2024-12-06 RX ORDER — ONDANSETRON 4 MG/1
TABLET, FILM COATED ORAL AS NEEDED
COMMUNITY
Start: 2021-01-05 | End: 2024-12-06 | Stop reason: SDUPTHER

## 2024-12-06 NOTE — ASSESSMENT & PLAN NOTE
Orders:    omeprazole (PriLOSEC) 40 MG capsule; Take 1 capsule (40 mg total) by mouth daily    ondansetron (Zofran) 4 mg tablet; Take 1 tablet (4 mg total) by mouth every 8 (eight) hours as needed for nausea or vomiting    Refills prescribed. Well managed per pt report.

## 2024-12-06 NOTE — PROGRESS NOTES
Adult Annual Physical  Name: Suly Cartagena      : 2000      MRN: [id number]  Encounter Provider: JANY Borja  Encounter Date: 2024   Encounter department: [facility]    Immunizations UTD.  No need for labs at this time.  PAP through GYN.  F/u in 1 year for annual physical or sooner PRN.  Assessment & Plan  Annual physical exam         Gastroesophageal reflux disease without esophagitis    Orders:    omeprazole (PriLOSEC) 40 MG capsule; Take 1 capsule (40 mg total) by mouth daily    ondansetron (Zofran) 4 mg tablet; Take 1 tablet (4 mg total) by mouth every 8 (eight) hours as needed for nausea or vomiting    Refills prescribed. Well managed per pt report.  Acne, unspecified acne type    Orders:    Adapalene 0.3 % gel; Apply 1 Application topically daily at bedtime    Refills prescribed. Well managed per pt report.    Immunizations and preventive care screenings were discussed with patient today. Appropriate education was printed on patient's after visit summary.    Counseling:  Alcohol/drug use: discussed moderation in alcohol intake, the recommendations for healthy alcohol use, and avoidance of illicit drug use.  Dental Health: discussed importance of regular tooth brushing, flossing, and dental visits.  Injury prevention: discussed safety/seat belts, safety helmets, smoke detectors, carbon monoxide detectors, and smoking near bedding or upholstery.  Sexual health: discussed sexually transmitted diseases, partner selection, use of condoms, avoidance of unintended pregnancy, and contraceptive alternatives.  Exercise: the importance of regular exercise/physical activity was discussed. Recommend exercise 3-5 times per week for at least 30 minutes.       Depression Screening and Follow-up Plan: Patient was screened for depression during today's encounter. They screened negative with a PHQ-2 score of 0.        History of Present Illness     Adult  "Annual Physical:  Patient presents for annual physical. Pt here today for annual physical.  Overall she is feeling well..     Diet and Physical Activity:  - Diet/Nutrition: well balanced diet, consuming 3-5 servings of fruits/vegetables daily and adequate fiber intake.  - Exercise: moderate cardiovascular exercise, 5-7 times a week on average and 30-60 minutes on average.    Depression Screening:  - PHQ-2 Score: 0    General Health:  - Sleep: 7-8 hours of sleep on average and sleeps well.  - Hearing: normal hearing bilateral ears.  - Vision: goes for regular eye exams and no vision problems.  - Dental: brushes teeth twice daily and regular dental visits.    /GYN Health:  - Follows with GYN: yes.   - Contraception: IUD placement.      Review of Systems   Constitutional: Negative.  Negative for chills and fatigue.   HENT: Negative.     Respiratory: Negative.  Negative for cough and shortness of breath.    Cardiovascular: Negative.  Negative for chest pain.   Gastrointestinal: Negative.    Genitourinary: Negative.    Musculoskeletal: Negative.  Negative for myalgias.   Neurological: Negative.      Medical History Reviewed by provider this encounter:  Meds  Problems     .    Objective   /78   Pulse 65   Temp 97.8 °F (36.6 °C)   Resp 14   Ht 5' 1.5\" (1.562 m)   Wt 58.2 kg (128 lb 6.4 oz)   SpO2 99%   BMI 23.87 kg/m²     Physical Exam  Vitals and nursing note reviewed.   Constitutional:       General: She is not in acute distress.     Appearance: Normal appearance. She is well-developed. She is not ill-appearing.   HENT:      Head: Normocephalic and atraumatic.      Right Ear: Tympanic membrane, ear canal and external ear normal.      Left Ear: Tympanic membrane, ear canal and external ear normal.   Eyes:      Conjunctiva/sclera: Conjunctivae normal.   Neck:      Vascular: No carotid bruit.   Cardiovascular:      Rate and Rhythm: Normal rate and regular rhythm.      Pulses: Normal pulses.      Heart " sounds: Normal heart sounds. No murmur heard.  Pulmonary:      Effort: Pulmonary effort is normal. No respiratory distress.      Breath sounds: Normal breath sounds. No wheezing.   Abdominal:      General: There is no distension.      Palpations: Abdomen is soft. There is no mass.      Tenderness: There is no abdominal tenderness. There is no guarding or rebound.      Hernia: No hernia is present.   Musculoskeletal:         General: Normal range of motion.      Cervical back: Normal range of motion and neck supple.      Right lower leg: No edema.      Left lower leg: No edema.   Skin:     General: Skin is warm and dry.      Capillary Refill: Capillary refill takes less than 2 seconds.   Neurological:      Mental Status: She is alert and oriented to person, place, and time. Mental status is at baseline.      Motor: No weakness.      Gait: Gait normal.   Psychiatric:         Mood and Affect: Mood normal.         Behavior: Behavior normal.         Thought Content: Thought content normal.         Judgment: Judgment normal.

## 2025-01-09 ENCOUNTER — OFFICE VISIT (OUTPATIENT)
Age: 25
End: 2025-01-09
Payer: COMMERCIAL

## 2025-01-09 ENCOUNTER — APPOINTMENT (OUTPATIENT)
Dept: RADIOLOGY | Facility: CLINIC | Age: 25
End: 2025-01-09
Payer: COMMERCIAL

## 2025-01-09 VITALS
SYSTOLIC BLOOD PRESSURE: 120 MMHG | DIASTOLIC BLOOD PRESSURE: 72 MMHG | BODY MASS INDEX: 23.66 KG/M2 | WEIGHT: 128.6 LBS | HEIGHT: 62 IN

## 2025-01-09 DIAGNOSIS — R10.33 UMBILICAL PAIN: Primary | ICD-10-CM

## 2025-01-09 DIAGNOSIS — R10.33 UMBILICAL PAIN: ICD-10-CM

## 2025-01-09 PROCEDURE — G2211 COMPLEX E/M VISIT ADD ON: HCPCS | Performed by: INTERNAL MEDICINE

## 2025-01-09 PROCEDURE — 74022 RADEX COMPL AQT ABD SERIES: CPT

## 2025-01-09 PROCEDURE — 99204 OFFICE O/P NEW MOD 45 MIN: CPT | Performed by: INTERNAL MEDICINE

## 2025-01-09 NOTE — PROGRESS NOTES
"Name: Suly Cartagena      : 2000      MRN: [id number]  Encounter Provider: Carlton Gentile DO  Encounter Date: 2025   Encounter department: [facility] GASTROENTEROLOGY SPECIALIST [place]  :  Assessment & Plan  Umbilical pain  No hernia appreciated on exam.  She did have a complete ultrasound of the abdomen for this same indication in the past with that was unrevealing.  On exam she does have dullness to percussion despite having a bowel movement today, so I am suspecting that she is not completely evacuating and experiencing a strong gastrocolic reflex, explaining her pain.  I will check a quick x-ray to assess stool burden start MiraLAX if indicated  Orders:    XR abdomen obstruction series; Future        History of Present Illness   HPI  Suly Cartagena is a 24 y.o. female who presents in consultation from her PCP due to intermittent periumbilical abdominal pain.  She states that intermittently she will experience this pain, usually after eating.  She denies constipation and diarrhea as well as blood in the stool.  She does have very rare, intermittent heartburn.  She has this pain seemingly in bouts, the last one was in .  [place]  and saw a physician assistant who prescribed omeprazole and did some lab work and an abdominal ultrasound, which was unrevealing.  The omeprazole does not help the pain at all.  She has never had an upper endoscopy or colonoscopy.  She denies family history of inflammatory bowel disease and colon cancer, however her father does have bad reflux issues, and his brother  from esophageal cancer.      Review of Systems       Objective   /72   Ht 5' 1.5\" (1.562 m)   Wt 58.3 kg (128 lb 9.6 oz)   BMI 23.91 kg/m²      Physical Exam  General Appearance: NAD, cooperative, alert  Eyes: Anicteric  GI:  Soft, non-tender, non-distended; normal bowel sounds; no masses, no organomegaly   Rectal: Deferred  Musculoskeletal: No " edema.  Skin:  No jaundice

## 2025-01-13 ENCOUNTER — RESULTS FOLLOW-UP (OUTPATIENT)
Dept: GASTROENTEROLOGY | Facility: CLINIC | Age: 25
End: 2025-01-13

## 2025-02-26 ENCOUNTER — OFFICE VISIT (OUTPATIENT)
Dept: OBGYN CLINIC | Facility: CLINIC | Age: 25
End: 2025-02-26
Payer: COMMERCIAL

## 2025-02-26 VITALS
DIASTOLIC BLOOD PRESSURE: 62 MMHG | WEIGHT: 131.2 LBS | SYSTOLIC BLOOD PRESSURE: 114 MMHG | HEIGHT: 62 IN | BODY MASS INDEX: 24.14 KG/M2

## 2025-02-26 DIAGNOSIS — Z01.419 GYNECOLOGIC EXAM NORMAL: Primary | ICD-10-CM

## 2025-02-26 PROCEDURE — S0612 ANNUAL GYNECOLOGICAL EXAMINA: HCPCS | Performed by: PHYSICIAN ASSISTANT

## 2025-02-26 NOTE — PROGRESS NOTES
[facility] OB/GYN [place] [id number] [place] [address]    ASSESSMENT/PLAN: Suly Cartagena is a 25 y.o.  who presents for annual gynecologic exam.    Encounter for routine gynecologic examination  - Routine well woman exam completed today.  - Cervical Cancer Screening: Current ASCCP Guidelines reviewed. Last Pap: 2024 NILM (-)HRHPV. History of abnormal: no  - HPV Vaccination status: Immunization series complete  - STI screening offered including HIV testing: offered, pt declined  - Contraceptive counseling discussed.  Current contraception: IUD, Mirena IUD 2021:       Additional problems addressed during this visit:  1. Gynecologic exam normal  Assessment & Plan:  Pap guidelines reviewed. Pap deferred secondary to negative pap and HPV in  in this low risk patient. Continue Mirena IUD.   Return to office for annual or as needed.       CC:  Annual Gynecologic Examination    HPI: Suly Cartagena is a 25 y.o.  who presents for annual gynecologic examination. Currently has Mirena IUD inserted 2021. Menses light, denies bowel or bladder issues.     ROS: Negative except as noted in HPI    Patient's last menstrual period was 2025 (exact date).       She  reports being sexually active and has had partner(s) who are male. She reports using the following method of birth control/protection: I.U.D..       The following portions of the patient's history were reviewed and updated as appropriate:   Past Medical History:   Diagnosis Date    Allergic     Anxiety     Chlamydia 2017    Depression     GERD (gastroesophageal reflux disease)      Past Surgical History:   Procedure Laterality Date    BREAST SURGERY      reduction 2019     Family History   Problem Relation Age of Onset    Hypothyroidism Mother     Asthma Mother     Diabetes Mother         Prediabetes    Hyperlipidemia Mother     Thyroid disease Mother         Hypothyroidism    Colon polyps Mother     Heart disease Father      COPD Father     No Known Problems Brother     Hypothyroidism Maternal Grandmother     Hyperlipidemia Maternal Grandmother     Thyroid disease Maternal Grandmother         Hypothyroidism    Dementia Maternal Grandmother     No Known Problems Maternal Grandfather     Heart disease Paternal Grandmother     Dementia Paternal Grandmother     Heart disease Paternal Grandfather     Colon cancer Paternal Aunt         age of diagnosis around 70s    Colon cancer Paternal Aunt         Dx age ~70    Heart disease Paternal Uncle     Esophageal cancer Paternal Uncle     Heart disease Paternal Uncle     Alcohol abuse Neg Hx     Substance Abuse Neg Hx     Mental illness Neg Hx      Social History     Socioeconomic History    Marital status: Single     Spouse name: None    Number of children: None    Years of education: None    Highest education level: None   Occupational History    None   Tobacco Use    Smoking status: Never     Passive exposure: Never    Smokeless tobacco: Never   Vaping Use    Vaping status: Never Used   Substance and Sexual Activity    Alcohol use: Yes     Alcohol/week: 1.0 - 2.0 standard drink of alcohol     Types: 1 - 2 Standard drinks or equivalent per week     Comment: 1-2x per month    Drug use: Never    Sexual activity: Yes     Partners: Male     Birth control/protection: I.U.D.   Other Topics Concern    None   Social History Narrative    None     Social Drivers of Health     Financial Resource Strain: Not on file   Food Insecurity: Not on file   Transportation Needs: Not on file   Physical Activity: Not on file   Stress: Not on file   Social Connections: Not on file   Intimate Partner Violence: Not on file   Housing Stability: Not on file     Outpatient Medications Marked as Taking for the 2/26/25 encounter (Office Visit) with Karen Dawn PA-C   Medication    Adapalene 0.3 % gel    DULoxetine (CYMBALTA) 60 mg delayed release capsule    Levonorgestrel (Mirena, 52 MG,) 20 MCG/DAY IUD    ondansetron  "(Zofran) 4 mg tablet     Allergies   Allergen Reactions    Pollen Extract Nasal Congestion    Latex Other (See Comments)     Pt states no longer allergic             Objective:  /62 (BP Location: Left arm, Patient Position: Sitting, Cuff Size: Standard)   Ht 5' 1.5\" (1.562 m)   Wt 59.5 kg (131 lb 3.2 oz)   LMP 02/22/2025 (Exact Date)   Breastfeeding No   BMI 24.39 kg/m²        Chaperone present? Offered, declines.      Physical Exam  Constitutional:       Appearance: Normal appearance. She is well-developed.   Genitourinary:      Vulva and bladder normal.      No lesions in the vagina.      Right Labia: No rash, tenderness, lesions or skin changes.     Left Labia: No tenderness, lesions, skin changes or rash.     No labial fusion noted.      No inguinal adenopathy present in the right or left side.     No vaginal discharge, erythema, tenderness or bleeding.        Right Adnexa: not tender, not full and no mass present.     Left Adnexa: not tender, not full and no mass present.     No cervical motion tenderness, discharge or lesion.      Uterus is not enlarged, tender or irregular.      No uterine mass detected.     No urethral prolapse, tenderness or mass present.      Bladder is not tender.    Breasts:     Breasts are symmetrical.      Right: No swelling, bleeding, inverted nipple, mass, nipple discharge, skin change or tenderness.      Left: No swelling, bleeding, inverted nipple, mass, nipple discharge, skin change or tenderness.   HENT:      Head: Normocephalic and atraumatic.   Neck:      Thyroid: No thyromegaly.   Cardiovascular:      Rate and Rhythm: Normal rate and regular rhythm.      Heart sounds: Normal heart sounds. No murmur heard.     No friction rub. No gallop.   Pulmonary:      Effort: Pulmonary effort is normal. No respiratory distress.      Breath sounds: Normal breath sounds. No wheezing.   Abdominal:      General: There is no distension.      Palpations: Abdomen is soft. There is no " mass.      Tenderness: There is no abdominal tenderness. There is no guarding or rebound.      Hernia: No hernia is present.   Lymphadenopathy:      Cervical: No cervical adenopathy.      Upper Body:      Right upper body: No supraclavicular, axillary or pectoral adenopathy.      Left upper body: No supraclavicular, axillary or pectoral adenopathy.      Lower Body: No right inguinal adenopathy. No left inguinal adenopathy.   Neurological:      Mental Status: She is alert and oriented to person, place, and time.   Skin:     General: Skin is warm and dry.   Psychiatric:         Behavior: Behavior normal.             Karen Dawn PA-C  2/27/2025 9:57 AM

## 2025-02-27 PROBLEM — Z01.419 GYNECOLOGIC EXAM NORMAL: Status: ACTIVE | Noted: 2025-02-27

## 2025-02-27 NOTE — ASSESSMENT & PLAN NOTE
Pap guidelines reviewed. Pap deferred secondary to negative pap and HPV in 2024 in this low risk patient. Continue Mirena IUD.   Return to office for annual or as needed.